# Patient Record
Sex: FEMALE | Race: WHITE | NOT HISPANIC OR LATINO | Employment: STUDENT | URBAN - METROPOLITAN AREA
[De-identification: names, ages, dates, MRNs, and addresses within clinical notes are randomized per-mention and may not be internally consistent; named-entity substitution may affect disease eponyms.]

---

## 2019-07-16 ENCOUNTER — OFFICE VISIT (OUTPATIENT)
Dept: OBSTETRICS AND GYNECOLOGY | Facility: CLINIC | Age: 29
End: 2019-07-16
Payer: COMMERCIAL

## 2019-07-16 ENCOUNTER — TELEPHONE (OUTPATIENT)
Dept: OBSTETRICS AND GYNECOLOGY | Facility: CLINIC | Age: 29
End: 2019-07-16

## 2019-07-16 VITALS
HEART RATE: 70 BPM | HEIGHT: 63 IN | RESPIRATION RATE: 16 BRPM | WEIGHT: 118 LBS | BODY MASS INDEX: 20.91 KG/M2 | DIASTOLIC BLOOD PRESSURE: 60 MMHG | SYSTOLIC BLOOD PRESSURE: 110 MMHG

## 2019-07-16 DIAGNOSIS — Z32.01 PREGNANCY EXAMINATION OR TEST, POSITIVE RESULT: ICD-10-CM

## 2019-07-16 DIAGNOSIS — N91.2 AMENORRHEA: Primary | ICD-10-CM

## 2019-07-16 LAB
B-HCG UR QL: POSITIVE
CTP QC/QA: YES

## 2019-07-16 PROCEDURE — 99999 PR PBB SHADOW E&M-NEW PATIENT-LVL III: ICD-10-PCS | Mod: PBBFAC,,, | Performed by: ADVANCED PRACTICE MIDWIFE

## 2019-07-16 PROCEDURE — 81025 POCT URINE PREGNANCY: ICD-10-PCS | Mod: S$GLB,,, | Performed by: ADVANCED PRACTICE MIDWIFE

## 2019-07-16 PROCEDURE — 81025 URINE PREGNANCY TEST: CPT | Mod: S$GLB,,, | Performed by: ADVANCED PRACTICE MIDWIFE

## 2019-07-16 PROCEDURE — 99999 PR PBB SHADOW E&M-NEW PATIENT-LVL III: CPT | Mod: PBBFAC,,, | Performed by: ADVANCED PRACTICE MIDWIFE

## 2019-07-16 PROCEDURE — 87086 URINE CULTURE/COLONY COUNT: CPT

## 2019-07-16 PROCEDURE — 99204 PR OFFICE/OUTPT VISIT, NEW, LEVL IV, 45-59 MIN: ICD-10-PCS | Mod: 25,S$GLB,, | Performed by: ADVANCED PRACTICE MIDWIFE

## 2019-07-16 PROCEDURE — 99204 OFFICE O/P NEW MOD 45 MIN: CPT | Mod: 25,S$GLB,, | Performed by: ADVANCED PRACTICE MIDWIFE

## 2019-07-16 NOTE — TELEPHONE ENCOUNTER
----- Message from Inna Ríos MA sent at 7/16/2019  9:11 AM CDT -----  Contact: Self      ----- Message -----  From: Nette Castellon  Sent: 7/16/2019   8:49 AM  To: Adan Abraham Staff    Joanna Reyna  MRN: [id number]  No relevant phone numbers on file.    No care team member to display  OB? Yes, Unknown  What phone number can you be reached at? [phone]  Message:   Pt would like a NP dx preg appt with any female physician. Please return call.

## 2019-07-16 NOTE — PROGRESS NOTES
CHIEF COMPLAINT:   Patient presents with      Possible Pregnancy        HISTORY OF PRESENT ILLNESS  Joanna Reyna 29 y.o.  presents for pregnancy risk assessment.   The patient has no complaints today.  No nausea or vomiting. No bleeding or pain.  Pregnancy was  planned and is desired.  Partner is supportive of pregnancy.  Lives at home with fly.  Denies domestic abuse.  Denies chemical/pesticide/radiation exposure. Has had 3 normal vag del, [facility].   OB history:      LMP: Patient's last menstrual period was 2019 (exact date).  EDC: Estimated Date of Delivery: None noted.  EGA: 9+1      Health Maintenance   Topic Date Due    Lipid Panel  1988    TETANUS VACCINE  2006    Pap Smear with HPV Cotest  2009    Influenza Vaccine  2019       History reviewed. No pertinent past medical history.    History reviewed. No pertinent surgical history.    Family History   Problem Relation Age of Onset    Colon cancer Paternal Grandfather     Ovarian cancer Mother     Breast cancer Neg Hx        Social History     Socioeconomic History    Marital status: Single     Spouse name: Not on file    Number of children: Not on file    Years of education: Not on file    Highest education level: Not on file   Occupational History    Not on file   Social Needs    Financial resource strain: Not on file    Food insecurity:     Worry: Not on file     Inability: Not on file    Transportation needs:     Medical: Not on file     Non-medical: Not on file   Tobacco Use    Smoking status: Never Smoker    Smokeless tobacco: Never Used   Substance and Sexual Activity    Alcohol use: Not Currently    Drug use: Never    Sexual activity: Yes     Partners: Male     Birth control/protection: None     Comment: single   Lifestyle    Physical activity:     Days per week: Not on file     Minutes per session: Not on file     Stress: Not on file   Relationships    Social connections:     Talks on phone: Not on file     Gets together: Not on file     Attends Latter-day service: Not on file     Active member of club or organization: Not on file     Attends meetings of clubs or organizations: Not on file     Relationship status: Not on file   Other Topics Concern    Not on file   Social History Narrative    Not on file       Current Outpatient Medications   Medication Sig Dispense Refill    prenatal vit calc,iron,folic (PRENATAL VITAMIN ORAL) Take by mouth.       No current facility-administered medications for this visit.        Review of patient's allergies indicates:  No Known Allergies      PHYSICAL EXAM   Vitals:    07/16/19 1647   BP: 110/60   Pulse: 70   Resp: 16        PAIN SCALE: 0/10 None    PHYSICAL EXAM    ROS:  GENERAL: No fever, chills, fatigability or weight loss.  CV: Denies chest pain  PULM: Denies shortness of breath or wheezing.  ABDOMEN: Appetite fine. No weight loss. Denies diarrhea, abdominal pain, hematemesis or blood in stool.  URINARY: No flank pain, dysuria or hematuria.  REPRODUCTIVE: No abnormal vaginal bleeding.       PE:   APPEARANCE: Well nourished, well developed, in no acute distress  CHEST: Clear to auscultation bilaterally  CV: Regular rate and rhythm  BREASTS: Symmetrical, no skin changes or visible lesions. No palpable masses, nipple discharge or adenopathy bilaterally.  ABDOMEN: Soft. No tenderness or masses. No hepatosplenomegaly. No hernias  PELVIC:   VULVA: No lesions. Normal female genitalia.  URETHRAL MEATUS: Normal size and location, no lesions, no prolapse.  URETHRA: No masses, tenderness, prolapse or scarring.  VAGINA: Moist and well rugated, no discharge, no significant cystocele or rectocele.  CERVIX: No lesions, normal diameter, no stenosis, no cervical motion tenderness.   UTERUS:regular shape, mobile, non-tender, normal position, good support.  ADNEXA: No masses, tenderness or CDS  nodularity.  ANUS PERINEUM: No lesions, no relaxation, no external hemorrhoids.     UPT +    A/P:     -      Patient was counseled today on A.C.S. Pap guidelines and recommendations for yearly pelvic exams, mammograms and monthly self breast exams; to see her PCP for other health maintenance and pregnancy.   -      Patient's medications and medical history reviewed with patient and implications in pregnancy.   -      Pregnancy course discussed and 'AtoZ' book given. Patient was counseled on proper weight gain based on the [place] [employer]'s recommendations based on her pre-pregnancy weight. Discussed foods to avoid in pregnancy (i.e. sushi, fish that are high in mercury, deli meat, and unpasteurized cheeses). Discussed prenatal vitamin options (i.e. stool softener, DHA). Discussed potential medical problems in pregnancy.  -     Discussed risk of Toxoplasmosis transmission from pets and reviewed risk reduction techniques.  -     Pt was counseled on exercise in pregnancy and weight gain recommendations.  -     Pt was counseled on travel recommendations and on risks of Zika virus exposure.  Current CDC Zika advisories and prevention techniques were reviewed with pt.  Pt denies any recent international travel and does not plan travel during pregnancy.  Pt reports that partner does not plan travel either.   -     Oriented to practice including CNM collaboration.   -     Follow-up initial OB 4 weeks, with labs today and u/s this week.

## 2019-07-17 ENCOUNTER — PATIENT MESSAGE (OUTPATIENT)
Dept: OBSTETRICS AND GYNECOLOGY | Facility: CLINIC | Age: 29
End: 2019-07-17

## 2019-07-17 ENCOUNTER — TELEPHONE (OUTPATIENT)
Dept: OBSTETRICS AND GYNECOLOGY | Facility: CLINIC | Age: 29
End: 2019-07-17

## 2019-07-17 NOTE — TELEPHONE ENCOUNTER
Consult and visit notes from [facility] (DOS 05/2019-present) received. Reviewed by Leah Salcedo CNM . Scanned to chart.

## 2019-07-18 ENCOUNTER — LAB VISIT (OUTPATIENT)
Dept: LAB | Facility: HOSPITAL | Age: 29
End: 2019-07-18
Attending: ADVANCED PRACTICE MIDWIFE
Payer: COMMERCIAL

## 2019-07-18 ENCOUNTER — PROCEDURE VISIT (OUTPATIENT)
Dept: OBSTETRICS AND GYNECOLOGY | Facility: CLINIC | Age: 29
End: 2019-07-18
Payer: COMMERCIAL

## 2019-07-18 DIAGNOSIS — N91.2 AMENORRHEA: ICD-10-CM

## 2019-07-18 LAB
ABO + RH BLD: NORMAL
BACTERIA UR CULT: NORMAL
BASOPHILS # BLD AUTO: 0.03 K/UL (ref 0–0.2)
BASOPHILS NFR BLD: 0.3 % (ref 0–1.9)
BLD GP AB SCN CELLS X3 SERPL QL: NORMAL
DIFFERENTIAL METHOD: ABNORMAL
EOSINOPHIL # BLD AUTO: 0.1 K/UL (ref 0–0.5)
EOSINOPHIL NFR BLD: 0.5 % (ref 0–8)
ERYTHROCYTE [DISTWIDTH] IN BLOOD BY AUTOMATED COUNT: 13.1 % (ref 11.5–14.5)
HCT VFR BLD AUTO: 35.6 % (ref 37–48.5)
HGB BLD-MCNC: 12.2 G/DL (ref 12–16)
IMM GRANULOCYTES # BLD AUTO: 0.04 K/UL (ref 0–0.04)
IMM GRANULOCYTES NFR BLD AUTO: 0.4 % (ref 0–0.5)
LYMPHOCYTES # BLD AUTO: 2.9 K/UL (ref 1–4.8)
LYMPHOCYTES NFR BLD: 27.1 % (ref 18–48)
MCH RBC QN AUTO: 31.4 PG (ref 27–31)
MCHC RBC AUTO-ENTMCNC: 34.3 G/DL (ref 32–36)
MCV RBC AUTO: 92 FL (ref 82–98)
MONOCYTES # BLD AUTO: 0.6 K/UL (ref 0.3–1)
MONOCYTES NFR BLD: 5.9 % (ref 4–15)
NEUTROPHILS # BLD AUTO: 7 K/UL (ref 1.8–7.7)
NEUTROPHILS NFR BLD: 65.8 % (ref 38–73)
NRBC BLD-RTO: 0 /100 WBC
PLATELET # BLD AUTO: 223 K/UL (ref 150–350)
PMV BLD AUTO: 11.3 FL (ref 9.2–12.9)
RBC # BLD AUTO: 3.89 M/UL (ref 4–5.4)
WBC # BLD AUTO: 10.64 K/UL (ref 3.9–12.7)

## 2019-07-18 PROCEDURE — 86901 BLOOD TYPING SEROLOGIC RH(D): CPT

## 2019-07-18 PROCEDURE — 36415 COLL VENOUS BLD VENIPUNCTURE: CPT

## 2019-07-18 PROCEDURE — 99499 NO LOS: ICD-10-PCS | Mod: S$GLB,,, | Performed by: OBSTETRICS & GYNECOLOGY

## 2019-07-18 PROCEDURE — 76801 OB US < 14 WKS SINGLE FETUS: CPT | Mod: S$GLB,,, | Performed by: OBSTETRICS & GYNECOLOGY

## 2019-07-18 PROCEDURE — 86703 HIV-1/HIV-2 1 RESULT ANTBDY: CPT

## 2019-07-18 PROCEDURE — 86762 RUBELLA ANTIBODY: CPT

## 2019-07-18 PROCEDURE — 87340 HEPATITIS B SURFACE AG IA: CPT

## 2019-07-18 PROCEDURE — 85025 COMPLETE CBC W/AUTO DIFF WBC: CPT

## 2019-07-18 PROCEDURE — 99499 UNLISTED E&M SERVICE: CPT | Mod: S$GLB,,, | Performed by: OBSTETRICS & GYNECOLOGY

## 2019-07-18 PROCEDURE — 81220 CFTR GENE COM VARIANTS: CPT

## 2019-07-18 PROCEDURE — 76801 PR US, OB <14WKS, TRANSABD, SINGLE GESTATION: ICD-10-PCS | Mod: S$GLB,,, | Performed by: OBSTETRICS & GYNECOLOGY

## 2019-07-18 PROCEDURE — 86592 SYPHILIS TEST NON-TREP QUAL: CPT

## 2019-07-19 LAB
HBV SURFACE AG SERPL QL IA: NEGATIVE
HIV 1+2 AB+HIV1 P24 AG SERPL QL IA: NEGATIVE
RPR SER QL: NORMAL
RUBV IGG SER-ACNC: 66.2 IU/ML
RUBV IGG SER-IMP: REACTIVE

## 2019-07-25 ENCOUNTER — TELEPHONE (OUTPATIENT)
Dept: OBSTETRICS AND GYNECOLOGY | Facility: CLINIC | Age: 29
End: 2019-07-25

## 2019-07-25 DIAGNOSIS — Z14.1 CYSTIC FIBROSIS CARRIER IN FIRST TRIMESTER, ANTEPARTUM: ICD-10-CM

## 2019-07-25 DIAGNOSIS — O09.891 CYSTIC FIBROSIS CARRIER IN FIRST TRIMESTER, ANTEPARTUM: ICD-10-CM

## 2019-07-25 LAB — CFTR MUT ANL BLD/T: ABNORMAL

## 2019-07-25 NOTE — TELEPHONE ENCOUNTER
Lauren from [facility] main lab calling with results of CF screening. Reports CF mutation heterozygus Delta S 508. Leah Salcedo CNM notified at 12:50pm. Per Leah message received.

## 2019-08-13 ENCOUNTER — INITIAL PRENATAL (OUTPATIENT)
Dept: OBSTETRICS AND GYNECOLOGY | Facility: CLINIC | Age: 29
End: 2019-08-13
Payer: COMMERCIAL

## 2019-08-13 VITALS
WEIGHT: 122 LBS | BODY MASS INDEX: 21.61 KG/M2 | SYSTOLIC BLOOD PRESSURE: 106 MMHG | HEART RATE: 84 BPM | DIASTOLIC BLOOD PRESSURE: 67 MMHG

## 2019-08-13 DIAGNOSIS — O09.891 CYSTIC FIBROSIS CARRIER IN FIRST TRIMESTER, ANTEPARTUM: Primary | ICD-10-CM

## 2019-08-13 DIAGNOSIS — Z14.1 CYSTIC FIBROSIS CARRIER IN FIRST TRIMESTER, ANTEPARTUM: Primary | ICD-10-CM

## 2019-08-13 DIAGNOSIS — Z86.19 HISTORY OF CHLAMYDIA: ICD-10-CM

## 2019-08-13 DIAGNOSIS — Z3A.14 14 WEEKS GESTATION OF PREGNANCY: ICD-10-CM

## 2019-08-13 LAB
C TRACH DNA SPEC QL NAA+PROBE: NOT DETECTED
N GONORRHOEA DNA SPEC QL NAA+PROBE: NOT DETECTED

## 2019-08-13 PROCEDURE — 99999 PR PBB SHADOW E&M-EST. PATIENT-LVL II: ICD-10-PCS | Mod: PBBFAC,,, | Performed by: ADVANCED PRACTICE MIDWIFE

## 2019-08-13 PROCEDURE — 0500F INITIAL PRENATAL CARE VISIT: CPT | Mod: S$GLB,,, | Performed by: ADVANCED PRACTICE MIDWIFE

## 2019-08-13 PROCEDURE — 87491 CHLMYD TRACH DNA AMP PROBE: CPT

## 2019-08-13 PROCEDURE — 99999 PR PBB SHADOW E&M-EST. PATIENT-LVL II: CPT | Mod: PBBFAC,,, | Performed by: ADVANCED PRACTICE MIDWIFE

## 2019-08-13 PROCEDURE — 0500F PR INITIAL PRENATAL CARE VISIT: ICD-10-PCS | Mod: S$GLB,,, | Performed by: ADVANCED PRACTICE MIDWIFE

## 2019-08-13 NOTE — PROGRESS NOTES
Patient doing well. No vaginal bleeding or cramping noted. Discussed the need for an anatomy scan between 18-20 weeks. Discussed quad screen. RTC in 4 weeks.     Coffective counseling sheet Get Ready discussed with mother. Reinforced avoiding induction of labor unless medically indicated as well as comfort measures during labor.  Encouraged mother to download Coffective mobile aaron if she has not already done so. Mother verbalizes understanding.    Gen Probe damaris today      has not done CF screen , will order

## 2019-09-10 ENCOUNTER — ROUTINE PRENATAL (OUTPATIENT)
Dept: OBSTETRICS AND GYNECOLOGY | Facility: CLINIC | Age: 29
End: 2019-09-10
Payer: COMMERCIAL

## 2019-09-10 ENCOUNTER — PATIENT MESSAGE (OUTPATIENT)
Dept: OBSTETRICS AND GYNECOLOGY | Facility: CLINIC | Age: 29
End: 2019-09-10

## 2019-09-10 ENCOUNTER — PROCEDURE VISIT (OUTPATIENT)
Dept: OBSTETRICS AND GYNECOLOGY | Facility: CLINIC | Age: 29
End: 2019-09-10
Payer: COMMERCIAL

## 2019-09-10 VITALS
SYSTOLIC BLOOD PRESSURE: 110 MMHG | HEART RATE: 87 BPM | BODY MASS INDEX: 22.67 KG/M2 | WEIGHT: 128 LBS | DIASTOLIC BLOOD PRESSURE: 62 MMHG

## 2019-09-10 DIAGNOSIS — Z36.89 ENCOUNTER FOR FETAL ANATOMIC SURVEY: ICD-10-CM

## 2019-09-10 DIAGNOSIS — Z3A.14 14 WEEKS GESTATION OF PREGNANCY: ICD-10-CM

## 2019-09-10 DIAGNOSIS — Z3A.18 18 WEEKS GESTATION OF PREGNANCY: Primary | ICD-10-CM

## 2019-09-10 PROCEDURE — 99999 PR PBB SHADOW E&M-EST. PATIENT-LVL II: CPT | Mod: PBBFAC,,, | Performed by: ADVANCED PRACTICE MIDWIFE

## 2019-09-10 PROCEDURE — 99999 PR PBB SHADOW E&M-EST. PATIENT-LVL II: ICD-10-PCS | Mod: PBBFAC,,, | Performed by: ADVANCED PRACTICE MIDWIFE

## 2019-09-10 PROCEDURE — 76805 PR US, OB 14+WKS, TRANSABD, SINGLE GESTATION: ICD-10-PCS | Mod: S$GLB,,, | Performed by: OBSTETRICS & GYNECOLOGY

## 2019-09-10 PROCEDURE — 0502F PR SUBSEQUENT PRENATAL CARE: ICD-10-PCS | Mod: S$GLB,,, | Performed by: ADVANCED PRACTICE MIDWIFE

## 2019-09-10 PROCEDURE — 76805 OB US >/= 14 WKS SNGL FETUS: CPT | Mod: S$GLB,,, | Performed by: OBSTETRICS & GYNECOLOGY

## 2019-09-10 PROCEDURE — 0502F SUBSEQUENT PRENATAL CARE: CPT | Mod: S$GLB,,, | Performed by: ADVANCED PRACTICE MIDWIFE

## 2019-09-10 NOTE — LETTER
September 11, 2019      [facility]  [address] [place] [id number]  Phone: [phone]       Patient: Joanna Reyna   YOB: 1990  Date of Visit: 09/10/2019    To Whom It May Concern:    Donnie Reyna  was at [facility] on 09/10/2019. She may return to work/school on 9/11/2019 with no restrictions. If you have any questions or concerns, or if I can be of further assistance, please do not hesitate to contact me.    Sincerely,    Queta Goyal LPN

## 2019-09-10 NOTE — PROGRESS NOTES
Patient with no complaints. Denies vaginal bleeding or cramping.  Patient request  quad screen. Anatomy scan today. RTC in 4 weeks    Coffective counseling sheet Fall In Love discussed with mother. Reinforced immediate skin to skin, the magic first hour, importance of the first feeding and delaying routine procedures. Encouraged mother to download Coffective mobile aaron if she has not already done so. Mother verbalizes understanding.

## 2019-09-11 ENCOUNTER — PATIENT MESSAGE (OUTPATIENT)
Dept: OBSTETRICS AND GYNECOLOGY | Facility: CLINIC | Age: 29
End: 2019-09-11

## 2019-09-11 NOTE — TELEPHONE ENCOUNTER
Excuse for appointment 9/10/2019 created, signed and faxed to number provided. Fax confirmation received. Pt notified via pt portal.

## 2019-10-10 ENCOUNTER — ROUTINE PRENATAL (OUTPATIENT)
Dept: OBSTETRICS AND GYNECOLOGY | Facility: CLINIC | Age: 29
End: 2019-10-10
Payer: COMMERCIAL

## 2019-10-10 VITALS
WEIGHT: 137 LBS | HEART RATE: 86 BPM | BODY MASS INDEX: 24.27 KG/M2 | SYSTOLIC BLOOD PRESSURE: 98 MMHG | DIASTOLIC BLOOD PRESSURE: 60 MMHG

## 2019-10-10 DIAGNOSIS — Z3A.22 22 WEEKS GESTATION OF PREGNANCY: Primary | ICD-10-CM

## 2019-10-10 PROCEDURE — 0502F SUBSEQUENT PRENATAL CARE: CPT | Mod: S$GLB,,, | Performed by: ADVANCED PRACTICE MIDWIFE

## 2019-10-10 PROCEDURE — 99999 PR PBB SHADOW E&M-EST. PATIENT-LVL II: CPT | Mod: PBBFAC,,, | Performed by: ADVANCED PRACTICE MIDWIFE

## 2019-10-10 PROCEDURE — 0502F PR SUBSEQUENT PRENATAL CARE: ICD-10-PCS | Mod: S$GLB,,, | Performed by: ADVANCED PRACTICE MIDWIFE

## 2019-10-10 PROCEDURE — 99999 PR PBB SHADOW E&M-EST. PATIENT-LVL II: ICD-10-PCS | Mod: PBBFAC,,, | Performed by: ADVANCED PRACTICE MIDWIFE

## 2019-10-10 NOTE — PROGRESS NOTES
Patient with no complaints. Denies vaginal bleeding or cramping.  Good FM. Discussed with patient having glucose testing for gestational diabetes preformed between 24-28 weeks. RTC in 4 weeks.   Pt unsure the results of the FOB CF screen , she will get results to us   Coffective counseling sheet Learn Your Baby and Protect Breastfeeding discussed with mother. Instructed regarding feeding cues and methods to calm baby. Encouraged mother to download Coffective mobile aaron if she has not already done so.  Mother verbalized understanding.

## 2019-10-31 ENCOUNTER — PATIENT MESSAGE (OUTPATIENT)
Dept: OBSTETRICS AND GYNECOLOGY | Facility: CLINIC | Age: 29
End: 2019-10-31

## 2019-11-10 ENCOUNTER — PATIENT MESSAGE (OUTPATIENT)
Dept: OBSTETRICS AND GYNECOLOGY | Facility: CLINIC | Age: 29
End: 2019-11-10

## 2019-11-15 ENCOUNTER — ROUTINE PRENATAL (OUTPATIENT)
Dept: OBSTETRICS AND GYNECOLOGY | Facility: CLINIC | Age: 29
End: 2019-11-15
Payer: COMMERCIAL

## 2019-11-15 ENCOUNTER — LAB VISIT (OUTPATIENT)
Dept: LAB | Facility: HOSPITAL | Age: 29
End: 2019-11-15
Attending: ADVANCED PRACTICE MIDWIFE
Payer: COMMERCIAL

## 2019-11-15 VITALS
SYSTOLIC BLOOD PRESSURE: 98 MMHG | BODY MASS INDEX: 25.26 KG/M2 | HEART RATE: 87 BPM | DIASTOLIC BLOOD PRESSURE: 60 MMHG | WEIGHT: 142.63 LBS

## 2019-11-15 DIAGNOSIS — Z3A.22 22 WEEKS GESTATION OF PREGNANCY: ICD-10-CM

## 2019-11-15 DIAGNOSIS — R73.09 ABNORMAL GLUCOSE TOLERANCE TEST: Primary | ICD-10-CM

## 2019-11-15 LAB
BASOPHILS # BLD AUTO: 0.02 K/UL (ref 0–0.2)
BASOPHILS NFR BLD: 0.2 % (ref 0–1.9)
DIFFERENTIAL METHOD: ABNORMAL
EOSINOPHIL # BLD AUTO: 0.1 K/UL (ref 0–0.5)
EOSINOPHIL NFR BLD: 0.5 % (ref 0–8)
ERYTHROCYTE [DISTWIDTH] IN BLOOD BY AUTOMATED COUNT: 12.5 % (ref 11.5–14.5)
GLUCOSE SERPL-MCNC: 162 MG/DL (ref 70–140)
HCT VFR BLD AUTO: 29.4 % (ref 37–48.5)
HGB BLD-MCNC: 9.5 G/DL (ref 12–16)
IMM GRANULOCYTES # BLD AUTO: 0.18 K/UL (ref 0–0.04)
IMM GRANULOCYTES NFR BLD AUTO: 1.6 % (ref 0–0.5)
LYMPHOCYTES # BLD AUTO: 2.6 K/UL (ref 1–4.8)
LYMPHOCYTES NFR BLD: 22.8 % (ref 18–48)
MCH RBC QN AUTO: 32.1 PG (ref 27–31)
MCHC RBC AUTO-ENTMCNC: 32.3 G/DL (ref 32–36)
MCV RBC AUTO: 99 FL (ref 82–98)
MONOCYTES # BLD AUTO: 0.6 K/UL (ref 0.3–1)
MONOCYTES NFR BLD: 5.5 % (ref 4–15)
NEUTROPHILS # BLD AUTO: 8 K/UL (ref 1.8–7.7)
NEUTROPHILS NFR BLD: 69.4 % (ref 38–73)
NRBC BLD-RTO: 0 /100 WBC
PLATELET # BLD AUTO: 225 K/UL (ref 150–350)
PMV BLD AUTO: 11.2 FL (ref 9.2–12.9)
RBC # BLD AUTO: 2.96 M/UL (ref 4–5.4)
RPR SER QL: NORMAL
WBC # BLD AUTO: 11.55 K/UL (ref 3.9–12.7)

## 2019-11-15 PROCEDURE — 0502F SUBSEQUENT PRENATAL CARE: CPT | Mod: S$GLB,,, | Performed by: ADVANCED PRACTICE MIDWIFE

## 2019-11-15 PROCEDURE — 99999 PR PBB SHADOW E&M-EST. PATIENT-LVL II: CPT | Mod: PBBFAC,,, | Performed by: ADVANCED PRACTICE MIDWIFE

## 2019-11-15 PROCEDURE — 36415 COLL VENOUS BLD VENIPUNCTURE: CPT

## 2019-11-15 PROCEDURE — 86703 HIV-1/HIV-2 1 RESULT ANTBDY: CPT

## 2019-11-15 PROCEDURE — 82950 GLUCOSE TEST: CPT

## 2019-11-15 PROCEDURE — 0502F PR SUBSEQUENT PRENATAL CARE: ICD-10-PCS | Mod: S$GLB,,, | Performed by: ADVANCED PRACTICE MIDWIFE

## 2019-11-15 PROCEDURE — 85025 COMPLETE CBC W/AUTO DIFF WBC: CPT

## 2019-11-15 PROCEDURE — 86592 SYPHILIS TEST NON-TREP QUAL: CPT

## 2019-11-15 PROCEDURE — 99999 PR PBB SHADOW E&M-EST. PATIENT-LVL II: ICD-10-PCS | Mod: PBBFAC,,, | Performed by: ADVANCED PRACTICE MIDWIFE

## 2019-11-15 NOTE — PROGRESS NOTES
Patient with no complaints. Denies vaginal bleeding or contractions. Good FM. GTT/CBC ordered today.   labor precautions discussed with patient. RTC in 2 weeks.   Abnormal glucose test noted  Discussed 3 HR AND ordered   Pt discussed living arrangements changed   Pt possibly wants BTL, will decide by next ov.   Coffective counseling sheet Nourish discussed with mother. Reinforced basic breastfeeding position and latch as well as proper hand expression technique and avoidance of artificial nipples and formula unless medically indicated. Encouraged mother to download Coffective mobile aaron if she has not already done so.  Mother verbalizes understanding.

## 2019-11-18 LAB — HIV 1+2 AB+HIV1 P24 AG SERPL QL IA: NEGATIVE

## 2019-11-21 ENCOUNTER — LAB VISIT (OUTPATIENT)
Dept: LAB | Facility: HOSPITAL | Age: 29
End: 2019-11-21
Attending: ADVANCED PRACTICE MIDWIFE
Payer: MEDICAID

## 2019-11-21 DIAGNOSIS — R73.09 ABNORMAL GLUCOSE TOLERANCE TEST: ICD-10-CM

## 2019-11-21 LAB
GLUCOSE SERPL-MCNC: 125 MG/DL
GLUCOSE SERPL-MCNC: 139 MG/DL
GLUCOSE SERPL-MCNC: 174 MG/DL
GLUCOSE SERPL-MCNC: 80 MG/DL (ref 70–110)

## 2019-11-21 PROCEDURE — 36415 COLL VENOUS BLD VENIPUNCTURE: CPT

## 2019-11-21 PROCEDURE — 82952 GTT-ADDED SAMPLES: CPT

## 2019-11-21 PROCEDURE — 82951 GLUCOSE TOLERANCE TEST (GTT): CPT

## 2019-12-04 ENCOUNTER — ROUTINE PRENATAL (OUTPATIENT)
Dept: OBSTETRICS AND GYNECOLOGY | Facility: CLINIC | Age: 29
End: 2019-12-04
Payer: COMMERCIAL

## 2019-12-04 VITALS
WEIGHT: 148 LBS | SYSTOLIC BLOOD PRESSURE: 102 MMHG | BODY MASS INDEX: 26.22 KG/M2 | DIASTOLIC BLOOD PRESSURE: 62 MMHG | HEART RATE: 82 BPM

## 2019-12-04 DIAGNOSIS — Z3A.30 30 WEEKS GESTATION OF PREGNANCY: Primary | ICD-10-CM

## 2019-12-04 PROCEDURE — 99999 PR PBB SHADOW E&M-EST. PATIENT-LVL II: CPT | Mod: PBBFAC,,, | Performed by: ADVANCED PRACTICE MIDWIFE

## 2019-12-04 PROCEDURE — 99999 PR PBB SHADOW E&M-EST. PATIENT-LVL II: ICD-10-PCS | Mod: PBBFAC,,, | Performed by: ADVANCED PRACTICE MIDWIFE

## 2019-12-04 PROCEDURE — 0502F SUBSEQUENT PRENATAL CARE: CPT | Mod: S$GLB,,, | Performed by: ADVANCED PRACTICE MIDWIFE

## 2019-12-04 PROCEDURE — 0502F PR SUBSEQUENT PRENATAL CARE: ICD-10-PCS | Mod: S$GLB,,, | Performed by: ADVANCED PRACTICE MIDWIFE

## 2019-12-04 NOTE — PROGRESS NOTES
Patient with no complaints. Denies vaginal bleeding or contractions. Good FM. Growth scan ordered for next visit 2 weeks    Declined TDAP     Coffective Motivation Document discussed with mother. Reinforced benefits of breastfeeding, risk of formula, and cue based feedings. Encouraged mother to download Coffective mobile aaron if she has not already done so. Mother verbalizes understanding.

## 2019-12-19 ENCOUNTER — PROCEDURE VISIT (OUTPATIENT)
Dept: OBSTETRICS AND GYNECOLOGY | Facility: CLINIC | Age: 29
End: 2019-12-19
Payer: COMMERCIAL

## 2019-12-19 ENCOUNTER — ROUTINE PRENATAL (OUTPATIENT)
Dept: OBSTETRICS AND GYNECOLOGY | Facility: CLINIC | Age: 29
End: 2019-12-19
Payer: COMMERCIAL

## 2019-12-19 VITALS
SYSTOLIC BLOOD PRESSURE: 98 MMHG | HEART RATE: 88 BPM | DIASTOLIC BLOOD PRESSURE: 68 MMHG | WEIGHT: 149.63 LBS | BODY MASS INDEX: 26.5 KG/M2

## 2019-12-19 DIAGNOSIS — Z36.89 ENCOUNTER FOR ULTRASOUND TO ASSESS FETAL GROWTH: ICD-10-CM

## 2019-12-19 DIAGNOSIS — Z3A.32 32 WEEKS GESTATION OF PREGNANCY: Primary | ICD-10-CM

## 2019-12-19 DIAGNOSIS — Z3A.30 30 WEEKS GESTATION OF PREGNANCY: ICD-10-CM

## 2019-12-19 PROCEDURE — 99999 PR PBB SHADOW E&M-EST. PATIENT-LVL II: ICD-10-PCS | Mod: PBBFAC,,, | Performed by: ADVANCED PRACTICE MIDWIFE

## 2019-12-19 PROCEDURE — 76816 OB US FOLLOW-UP PER FETUS: CPT | Mod: S$GLB,,, | Performed by: OBSTETRICS & GYNECOLOGY

## 2019-12-19 PROCEDURE — 76816 PR  US,PREGNANT UTERUS,F/U,TRANSABD APP: ICD-10-PCS | Mod: S$GLB,,, | Performed by: OBSTETRICS & GYNECOLOGY

## 2019-12-19 PROCEDURE — 99999 PR PBB SHADOW E&M-EST. PATIENT-LVL II: CPT | Mod: PBBFAC,,, | Performed by: ADVANCED PRACTICE MIDWIFE

## 2019-12-19 PROCEDURE — 0502F SUBSEQUENT PRENATAL CARE: CPT | Mod: S$GLB,,, | Performed by: ADVANCED PRACTICE MIDWIFE

## 2019-12-19 PROCEDURE — 0502F PR SUBSEQUENT PRENATAL CARE: ICD-10-PCS | Mod: S$GLB,,, | Performed by: ADVANCED PRACTICE MIDWIFE

## 2019-12-19 NOTE — PROGRESS NOTES
Patient with no complaints. Denies vaginal bleeding or contractions. Good FM. Growth scan reviewed.    Reports occ palpitations with occ denies cp. Declined referral, and will notify us if persistent     Coffective counseling sheet Build Your Team discussed with mother. Reinforced importance of early identification of support team including champion, OB provider, pediatrician and local community resources. Encouraged mother to download Coffective mobile aaron if she has not already done so.  Mother verbalizes understanding. Also discussed quiet time and delayed bathing.

## 2019-12-19 NOTE — PROGRESS NOTES
Normal fluid and growth Obstetrical ultrasound completed today.  See report in imaging section of [facility].

## 2019-12-27 ENCOUNTER — TELEPHONE (OUTPATIENT)
Dept: OBSTETRICS AND GYNECOLOGY | Facility: CLINIC | Age: 29
End: 2019-12-27

## 2019-12-27 NOTE — TELEPHONE ENCOUNTER
33w4d OB states that she is having palpitations. She reports that she can feel her heart beating and racing. Instructed that palpitations can occur during pregnancy. That it may feel like heart is racing or skipping a beat. While palpitations can be normal, if they are associated with chest pain, shortness of breath, or fatigue she should be evaluated. Patient states that she does feel some tingling in her face and shortness of breath. Patient instructed to report to ER to be evaluated. Instructed that she should have someone drive her, and should not attempt to drive herself. Patient verbalized understanding and states that she will try to call and find someone to bring her. Instructed if she can not find anyone that she should call 911. Patient verbalized understanding.

## 2020-01-06 ENCOUNTER — ROUTINE PRENATAL (OUTPATIENT)
Dept: OBSTETRICS AND GYNECOLOGY | Facility: CLINIC | Age: 30
End: 2020-01-06
Payer: MEDICAID

## 2020-01-06 VITALS
WEIGHT: 153 LBS | BODY MASS INDEX: 27.1 KG/M2 | SYSTOLIC BLOOD PRESSURE: 116 MMHG | HEART RATE: 87 BPM | DIASTOLIC BLOOD PRESSURE: 66 MMHG

## 2020-01-06 DIAGNOSIS — Z3A.35 35 WEEKS GESTATION OF PREGNANCY: Primary | ICD-10-CM

## 2020-01-06 PROCEDURE — 99999 PR PBB SHADOW E&M-EST. PATIENT-LVL II: CPT | Mod: PBBFAC,,, | Performed by: ADVANCED PRACTICE MIDWIFE

## 2020-01-06 PROCEDURE — 99212 OFFICE O/P EST SF 10 MIN: CPT | Mod: PBBFAC | Performed by: ADVANCED PRACTICE MIDWIFE

## 2020-01-06 PROCEDURE — 99999 PR PBB SHADOW E&M-EST. PATIENT-LVL II: ICD-10-PCS | Mod: PBBFAC,,, | Performed by: ADVANCED PRACTICE MIDWIFE

## 2020-01-06 PROCEDURE — 0502F PR SUBSEQUENT PRENATAL CARE: ICD-10-PCS | Mod: ,,, | Performed by: ADVANCED PRACTICE MIDWIFE

## 2020-01-06 PROCEDURE — 0502F SUBSEQUENT PRENATAL CARE: CPT | Mod: ,,, | Performed by: ADVANCED PRACTICE MIDWIFE

## 2020-01-06 NOTE — PROGRESS NOTES
Patient with no complaints. Denies vaginal bleeding or contractions. Good FM. GBS collected today. Labor precautions discused with patient. RTC in 1 week.     Denies heart palpitations

## 2020-01-16 ENCOUNTER — ROUTINE PRENATAL (OUTPATIENT)
Dept: OBSTETRICS AND GYNECOLOGY | Facility: CLINIC | Age: 30
End: 2020-01-16
Payer: COMMERCIAL

## 2020-01-16 VITALS
WEIGHT: 154.19 LBS | SYSTOLIC BLOOD PRESSURE: 114 MMHG | DIASTOLIC BLOOD PRESSURE: 72 MMHG | HEART RATE: 84 BPM | BODY MASS INDEX: 27.32 KG/M2

## 2020-01-16 DIAGNOSIS — Z3A.36 36 WEEKS GESTATION OF PREGNANCY: Primary | ICD-10-CM

## 2020-01-16 PROCEDURE — 87081 CULTURE SCREEN ONLY: CPT

## 2020-01-16 PROCEDURE — 99999 PR PBB SHADOW E&M-EST. PATIENT-LVL II: CPT | Mod: PBBFAC,,, | Performed by: ADVANCED PRACTICE MIDWIFE

## 2020-01-16 PROCEDURE — 0502F PR SUBSEQUENT PRENATAL CARE: ICD-10-PCS | Mod: S$GLB,,, | Performed by: ADVANCED PRACTICE MIDWIFE

## 2020-01-16 PROCEDURE — 99999 PR PBB SHADOW E&M-EST. PATIENT-LVL II: ICD-10-PCS | Mod: PBBFAC,,, | Performed by: ADVANCED PRACTICE MIDWIFE

## 2020-01-16 PROCEDURE — 0502F SUBSEQUENT PRENATAL CARE: CPT | Mod: S$GLB,,, | Performed by: ADVANCED PRACTICE MIDWIFE

## 2020-01-16 NOTE — PROGRESS NOTES
Patient with no complaints. Denies vaginal bleeding or contractions. Good FM. GBS collected today. Labor precautions discused with patient. RTC in 1 week.

## 2020-01-17 ENCOUNTER — TELEPHONE (OUTPATIENT)
Dept: OBSTETRICS AND GYNECOLOGY | Facility: CLINIC | Age: 30
End: 2020-01-17

## 2020-01-18 LAB — BACTERIA SPEC AEROBE CULT: NORMAL

## 2020-01-22 ENCOUNTER — ROUTINE PRENATAL (OUTPATIENT)
Dept: OBSTETRICS AND GYNECOLOGY | Facility: CLINIC | Age: 30
End: 2020-01-22
Payer: COMMERCIAL

## 2020-01-22 VITALS
HEART RATE: 82 BPM | WEIGHT: 157 LBS | SYSTOLIC BLOOD PRESSURE: 120 MMHG | BODY MASS INDEX: 27.81 KG/M2 | DIASTOLIC BLOOD PRESSURE: 72 MMHG

## 2020-01-22 DIAGNOSIS — Z3A.37 37 WEEKS GESTATION OF PREGNANCY: Primary | ICD-10-CM

## 2020-01-22 PROCEDURE — 0502F PR SUBSEQUENT PRENATAL CARE: ICD-10-PCS | Mod: S$GLB,,, | Performed by: ADVANCED PRACTICE MIDWIFE

## 2020-01-22 PROCEDURE — 99999 PR PBB SHADOW E&M-EST. PATIENT-LVL II: ICD-10-PCS | Mod: PBBFAC,,, | Performed by: ADVANCED PRACTICE MIDWIFE

## 2020-01-22 PROCEDURE — 99999 PR PBB SHADOW E&M-EST. PATIENT-LVL II: CPT | Mod: PBBFAC,,, | Performed by: ADVANCED PRACTICE MIDWIFE

## 2020-01-22 PROCEDURE — 0502F SUBSEQUENT PRENATAL CARE: CPT | Mod: S$GLB,,, | Performed by: ADVANCED PRACTICE MIDWIFE

## 2020-01-22 RX ORDER — CYCLOBENZAPRINE HCL 5 MG
5 TABLET ORAL 3 TIMES DAILY PRN
Qty: 30 TABLET | Refills: 0 | Status: SHIPPED | OUTPATIENT
Start: 2020-01-22 | End: 2020-02-01

## 2020-01-22 NOTE — PROGRESS NOTES
Patient with no complaints. Denies vaginal bleeding or contractions. Good FM. Labor precautions discused with patient. RTC in 1 week.   Some c/o hip pain, flexeril rx sent

## 2020-01-29 ENCOUNTER — ROUTINE PRENATAL (OUTPATIENT)
Dept: OBSTETRICS AND GYNECOLOGY | Facility: CLINIC | Age: 30
End: 2020-01-29
Payer: COMMERCIAL

## 2020-01-29 VITALS
HEART RATE: 89 BPM | SYSTOLIC BLOOD PRESSURE: 122 MMHG | DIASTOLIC BLOOD PRESSURE: 78 MMHG | BODY MASS INDEX: 28.17 KG/M2 | WEIGHT: 159 LBS

## 2020-01-29 DIAGNOSIS — Z3A.38 38 WEEKS GESTATION OF PREGNANCY: Primary | ICD-10-CM

## 2020-01-29 PROCEDURE — 0502F PR SUBSEQUENT PRENATAL CARE: ICD-10-PCS | Mod: S$GLB,,, | Performed by: ADVANCED PRACTICE MIDWIFE

## 2020-01-29 PROCEDURE — 0502F SUBSEQUENT PRENATAL CARE: CPT | Mod: S$GLB,,, | Performed by: ADVANCED PRACTICE MIDWIFE

## 2020-01-29 PROCEDURE — 99999 PR PBB SHADOW E&M-EST. PATIENT-LVL II: CPT | Mod: PBBFAC,,, | Performed by: ADVANCED PRACTICE MIDWIFE

## 2020-01-29 PROCEDURE — 99999 PR PBB SHADOW E&M-EST. PATIENT-LVL II: ICD-10-PCS | Mod: PBBFAC,,, | Performed by: ADVANCED PRACTICE MIDWIFE

## 2020-01-29 NOTE — PROGRESS NOTES
Patient with no complaints. Denies vaginal bleeding or contractions. Good FM.. Labor precautions discused with patient. RTC in 1 week.

## 2020-01-31 ENCOUNTER — PATIENT MESSAGE (OUTPATIENT)
Dept: OBSTETRICS AND GYNECOLOGY | Facility: CLINIC | Age: 30
End: 2020-01-31

## 2020-02-06 ENCOUNTER — ROUTINE PRENATAL (OUTPATIENT)
Dept: OBSTETRICS AND GYNECOLOGY | Facility: CLINIC | Age: 30
End: 2020-02-06
Payer: COMMERCIAL

## 2020-02-06 VITALS
HEART RATE: 90 BPM | BODY MASS INDEX: 28.17 KG/M2 | DIASTOLIC BLOOD PRESSURE: 70 MMHG | SYSTOLIC BLOOD PRESSURE: 112 MMHG | WEIGHT: 159 LBS

## 2020-02-06 DIAGNOSIS — Z3A.39 39 WEEKS GESTATION OF PREGNANCY: Primary | ICD-10-CM

## 2020-02-06 PROCEDURE — 0502F SUBSEQUENT PRENATAL CARE: CPT | Mod: S$GLB,,, | Performed by: ADVANCED PRACTICE MIDWIFE

## 2020-02-06 PROCEDURE — 0502F PR SUBSEQUENT PRENATAL CARE: ICD-10-PCS | Mod: S$GLB,,, | Performed by: ADVANCED PRACTICE MIDWIFE

## 2020-02-06 PROCEDURE — 99999 PR PBB SHADOW E&M-EST. PATIENT-LVL II: ICD-10-PCS | Mod: PBBFAC,,, | Performed by: ADVANCED PRACTICE MIDWIFE

## 2020-02-06 PROCEDURE — 99999 PR PBB SHADOW E&M-EST. PATIENT-LVL II: CPT | Mod: PBBFAC,,, | Performed by: ADVANCED PRACTICE MIDWIFE

## 2020-02-06 NOTE — PROGRESS NOTES
Patient with no complaints. Denies vaginal bleeding or contractions. Good FM.  Labor precautions discused with patient. RTC in 1 week.

## 2020-02-10 ENCOUNTER — HOSPITAL ENCOUNTER (INPATIENT)
Facility: HOSPITAL | Age: 30
LOS: 2 days | Discharge: HOME | End: 2020-02-12
Attending: OBSTETRICS & GYNECOLOGY | Admitting: OBSTETRICS & GYNECOLOGY
Payer: MEDICAID

## 2020-02-10 ENCOUNTER — ANESTHESIA (OUTPATIENT)
Dept: OBSTETRICS AND GYNECOLOGY | Facility: HOSPITAL | Age: 30
End: 2020-02-10

## 2020-02-10 ENCOUNTER — ANESTHESIA EVENT (OUTPATIENT)
Dept: OBSTETRICS AND GYNECOLOGY | Facility: HOSPITAL | Age: 30
End: 2020-02-10

## 2020-02-10 ENCOUNTER — PATIENT MESSAGE (OUTPATIENT)
Dept: OBSTETRICS AND GYNECOLOGY | Facility: CLINIC | Age: 30
End: 2020-02-10

## 2020-02-10 DIAGNOSIS — O47.9 THREATENED LABOR AT TERM: ICD-10-CM

## 2020-02-10 DIAGNOSIS — O48.0 POST TERM PREGNANCY OVER 40 WEEKS: ICD-10-CM

## 2020-02-10 LAB
ABO + RH BLD: NORMAL
AMPHET+METHAMPHET UR QL: NEGATIVE
BARBITURATES UR QL SCN>200 NG/ML: NEGATIVE
BASOPHILS # BLD AUTO: 0.01 K/UL (ref 0–0.2)
BASOPHILS NFR BLD: 0.1 % (ref 0–1.9)
BENZODIAZ UR QL SCN>200 NG/ML: NEGATIVE
BLD GP AB SCN CELLS X3 SERPL QL: NORMAL
BZE UR QL SCN: NEGATIVE
CANNABINOIDS UR QL SCN: NEGATIVE
CREAT UR-MCNC: 19.8 MG/DL (ref 15–325)
DIFFERENTIAL METHOD: ABNORMAL
EOSINOPHIL # BLD AUTO: 0.1 K/UL (ref 0–0.5)
EOSINOPHIL NFR BLD: 0.4 % (ref 0–8)
ERYTHROCYTE [DISTWIDTH] IN BLOOD BY AUTOMATED COUNT: 13.4 % (ref 11.5–14.5)
HCT VFR BLD AUTO: 25.5 % (ref 37–48.5)
HGB BLD-MCNC: 8.2 G/DL (ref 12–16)
IMM GRANULOCYTES # BLD AUTO: 0.24 K/UL (ref 0–0.04)
IMM GRANULOCYTES NFR BLD AUTO: 2.1 % (ref 0–0.5)
LYMPHOCYTES # BLD AUTO: 2.3 K/UL (ref 1–4.8)
LYMPHOCYTES NFR BLD: 19.8 % (ref 18–48)
MCH RBC QN AUTO: 28.4 PG (ref 27–31)
MCHC RBC AUTO-ENTMCNC: 32.2 G/DL (ref 32–36)
MCV RBC AUTO: 88 FL (ref 82–98)
METHADONE UR QL SCN>300 NG/ML: NEGATIVE
MONOCYTES # BLD AUTO: 0.8 K/UL (ref 0.3–1)
MONOCYTES NFR BLD: 7.3 % (ref 4–15)
NEUTROPHILS # BLD AUTO: 8.1 K/UL (ref 1.8–7.7)
NEUTROPHILS NFR BLD: 70.3 % (ref 38–73)
NRBC BLD-RTO: 0 /100 WBC
OPIATES UR QL SCN: NEGATIVE
PCP UR QL SCN>25 NG/ML: NEGATIVE
PLATELET # BLD AUTO: 239 K/UL (ref 150–350)
PMV BLD AUTO: 11.6 FL (ref 9.2–12.9)
RBC # BLD AUTO: 2.89 M/UL (ref 4–5.4)
TOXICOLOGY INFORMATION: NORMAL
WBC # BLD AUTO: 11.51 K/UL (ref 3.9–12.7)

## 2020-02-10 PROCEDURE — 85025 COMPLETE CBC W/AUTO DIFF WBC: CPT

## 2020-02-10 PROCEDURE — 80307 DRUG TEST PRSMV CHEM ANLYZR: CPT

## 2020-02-10 PROCEDURE — 86901 BLOOD TYPING SEROLOGIC RH(D): CPT

## 2020-02-10 PROCEDURE — 99211 OFF/OP EST MAY X REQ PHY/QHP: CPT

## 2020-02-10 PROCEDURE — 63600175 PHARM REV CODE 636 W HCPCS: Performed by: ADVANCED PRACTICE MIDWIFE

## 2020-02-10 PROCEDURE — 11000001 HC ACUTE MED/SURG PRIVATE ROOM

## 2020-02-10 PROCEDURE — 25000003 PHARM REV CODE 250: Performed by: ADVANCED PRACTICE MIDWIFE

## 2020-02-10 PROCEDURE — 59409 PR OBSTETRICAL CARE,VAG DELIV ONLY: ICD-10-PCS | Mod: GB,,, | Performed by: ADVANCED PRACTICE MIDWIFE

## 2020-02-10 PROCEDURE — 59409 OBSTETRICAL CARE: CPT | Mod: GB,,, | Performed by: ADVANCED PRACTICE MIDWIFE

## 2020-02-10 PROCEDURE — 59025 FETAL NON-STRESS TEST: CPT

## 2020-02-10 PROCEDURE — 72200004 HC VAGINAL DELIVERY LEVEL I

## 2020-02-10 PROCEDURE — 72100002 HC LABOR CARE, 1ST 8 HOURS

## 2020-02-10 RX ORDER — OXYTOCIN/RINGER'S LACTATE 30/500 ML
95 PLASTIC BAG, INJECTION (ML) INTRAVENOUS ONCE
Status: COMPLETED | OUTPATIENT
Start: 2020-02-10 | End: 2020-02-10

## 2020-02-10 RX ORDER — ACETAMINOPHEN 325 MG/1
650 TABLET ORAL EVERY 6 HOURS PRN
Status: DISCONTINUED | OUTPATIENT
Start: 2020-02-10 | End: 2020-02-12 | Stop reason: HOSPADM

## 2020-02-10 RX ORDER — SODIUM CHLORIDE 9 MG/ML
INJECTION, SOLUTION INTRAVENOUS
Status: DISCONTINUED | OUTPATIENT
Start: 2020-02-10 | End: 2020-02-10

## 2020-02-10 RX ORDER — ONDANSETRON 4 MG/1
8 TABLET, ORALLY DISINTEGRATING ORAL EVERY 8 HOURS PRN
Status: DISCONTINUED | OUTPATIENT
Start: 2020-02-10 | End: 2020-02-10

## 2020-02-10 RX ORDER — IBUPROFEN 600 MG/1
600 TABLET ORAL EVERY 6 HOURS
Status: DISCONTINUED | OUTPATIENT
Start: 2020-02-11 | End: 2020-02-12 | Stop reason: HOSPADM

## 2020-02-10 RX ORDER — DIPHENHYDRAMINE HYDROCHLORIDE 50 MG/ML
25 INJECTION INTRAMUSCULAR; INTRAVENOUS EVERY 4 HOURS PRN
Status: DISCONTINUED | OUTPATIENT
Start: 2020-02-10 | End: 2020-02-12 | Stop reason: HOSPADM

## 2020-02-10 RX ORDER — HYDROCODONE BITARTRATE AND ACETAMINOPHEN 7.5; 325 MG/1; MG/1
1 TABLET ORAL EVERY 4 HOURS PRN
Status: DISCONTINUED | OUTPATIENT
Start: 2020-02-10 | End: 2020-02-12 | Stop reason: HOSPADM

## 2020-02-10 RX ORDER — OXYTOCIN/RINGER'S LACTATE 30/500 ML
95 PLASTIC BAG, INJECTION (ML) INTRAVENOUS ONCE
Status: DISCONTINUED | OUTPATIENT
Start: 2020-02-10 | End: 2020-02-10

## 2020-02-10 RX ORDER — CALCIUM CARBONATE 200(500)MG
500 TABLET,CHEWABLE ORAL 3 TIMES DAILY PRN
Status: DISCONTINUED | OUTPATIENT
Start: 2020-02-10 | End: 2020-02-10

## 2020-02-10 RX ORDER — PRENATAL WITH FERROUS FUM AND FOLIC ACID 3080; 920; 120; 400; 22; 1.84; 3; 20; 10; 1; 12; 200; 27; 25; 2 [IU]/1; [IU]/1; MG/1; [IU]/1; MG/1; MG/1; MG/1; MG/1; MG/1; MG/1; UG/1; MG/1; MG/1; MG/1; MG/1
1 TABLET ORAL DAILY
Status: DISCONTINUED | OUTPATIENT
Start: 2020-02-11 | End: 2020-02-12 | Stop reason: HOSPADM

## 2020-02-10 RX ORDER — HYDROCORTISONE 25 MG/G
CREAM TOPICAL 3 TIMES DAILY PRN
Status: DISCONTINUED | OUTPATIENT
Start: 2020-02-10 | End: 2020-02-12 | Stop reason: HOSPADM

## 2020-02-10 RX ORDER — SIMETHICONE 80 MG
1 TABLET,CHEWABLE ORAL 4 TIMES DAILY PRN
Status: DISCONTINUED | OUTPATIENT
Start: 2020-02-10 | End: 2020-02-10

## 2020-02-10 RX ORDER — ACETAMINOPHEN 500 MG
500 TABLET ORAL EVERY 6 HOURS PRN
Status: DISCONTINUED | OUTPATIENT
Start: 2020-02-10 | End: 2020-02-10

## 2020-02-10 RX ORDER — DIPHENHYDRAMINE HCL 25 MG
25 CAPSULE ORAL EVERY 4 HOURS PRN
Status: DISCONTINUED | OUTPATIENT
Start: 2020-02-10 | End: 2020-02-12 | Stop reason: HOSPADM

## 2020-02-10 RX ORDER — OXYTOCIN/RINGER'S LACTATE 30/500 ML
334 PLASTIC BAG, INJECTION (ML) INTRAVENOUS ONCE
Status: DISCONTINUED | OUTPATIENT
Start: 2020-02-10 | End: 2020-02-10

## 2020-02-10 RX ORDER — ONDANSETRON 4 MG/1
8 TABLET, ORALLY DISINTEGRATING ORAL EVERY 8 HOURS PRN
Status: DISCONTINUED | OUTPATIENT
Start: 2020-02-10 | End: 2020-02-10 | Stop reason: SDUPTHER

## 2020-02-10 RX ORDER — ONDANSETRON 8 MG/1
8 TABLET, ORALLY DISINTEGRATING ORAL EVERY 8 HOURS PRN
Status: DISCONTINUED | OUTPATIENT
Start: 2020-02-10 | End: 2020-02-12 | Stop reason: HOSPADM

## 2020-02-10 RX ORDER — HYDROCODONE BITARTRATE AND ACETAMINOPHEN 5; 325 MG/1; MG/1
1 TABLET ORAL EVERY 4 HOURS PRN
Status: DISCONTINUED | OUTPATIENT
Start: 2020-02-10 | End: 2020-02-12 | Stop reason: HOSPADM

## 2020-02-10 RX ORDER — SIMETHICONE 80 MG
1 TABLET,CHEWABLE ORAL EVERY 6 HOURS PRN
Status: DISCONTINUED | OUTPATIENT
Start: 2020-02-10 | End: 2020-02-12 | Stop reason: HOSPADM

## 2020-02-10 RX ORDER — BUTORPHANOL TARTRATE 2 MG/ML
1 INJECTION INTRAMUSCULAR; INTRAVENOUS
Status: DISCONTINUED | OUTPATIENT
Start: 2020-02-10 | End: 2020-02-10

## 2020-02-10 RX ORDER — DOCUSATE SODIUM 100 MG/1
200 CAPSULE, LIQUID FILLED ORAL 2 TIMES DAILY PRN
Status: DISCONTINUED | OUTPATIENT
Start: 2020-02-10 | End: 2020-02-12 | Stop reason: HOSPADM

## 2020-02-10 RX ADMIN — IBUPROFEN 600 MG: 600 TABLET ORAL at 09:02

## 2020-02-10 RX ADMIN — Medication 334 MILLI-UNITS/MIN: at 06:02

## 2020-02-10 RX ADMIN — SODIUM CHLORIDE, SODIUM LACTATE, POTASSIUM CHLORIDE, AND CALCIUM CHLORIDE 1000 ML: .6; .31; .03; .02 INJECTION, SOLUTION INTRAVENOUS at 10:02

## 2020-02-10 RX ADMIN — HYDROCODONE BITARTRATE AND ACETAMINOPHEN 1 TABLET: 5; 325 TABLET ORAL at 06:02

## 2020-02-10 NOTE — H&P
[facility]  Obstetrics  History & Physical    Patient Name: Joanna Reyna  MRN: [id number]  Admission Date: 2/10/2020  Primary Care Provider: [facility]    Subjective:     Principal Problem:Threatened labor at term    History of Present Illness:  No notes on file    Obstetric HPI:  Patient reports  Contractions q 2-3 minutes this morning, active fetal movement, No vaginal bleeding , No loss of fluid     This pregnancy has been complicated by THC use, CF carrier, FOB negative, hx of tx Chlamydia      OB History    Para Term  AB Living   3 2 2 0 0 2   SAB TAB Ectopic Multiple Live Births   0 0 0 0 2      # Outcome Date GA Lbr Rashawn/2nd Weight Sex Delivery Anes PTL Lv   3 Current            2 Term 13 40w0d  2.381 kg (5 lb 4 oz) M Vag-Spont EPI  AGATHA   1 Term 11 40w0d  3.629 kg (8 lb) M Vag-Spont EPI  AGATHA     Past Medical History:   Diagnosis Date    Depression      History reviewed. No pertinent surgical history.    PTA Medications   Medication Sig    aspirin-sod bicarb-citric acid (DWIGHT-SELTZER) 324 mg TbEF Take 325 mg by mouth every 6 (six) hours as needed.    prenatal vit calc,iron,folic (PRENATAL VITAMIN ORAL) Take by mouth.       Review of patient's allergies indicates:  No Known Allergies     Family History     Problem Relation (Age of Onset)    Colon cancer Paternal Grandfather    Ovarian cancer Mother        Tobacco Use    Smoking status: Never Smoker    Smokeless tobacco: Never Used   Substance and Sexual Activity    Alcohol use: Not Currently    Drug use: Never    Sexual activity: Yes     Partners: Male     Birth control/protection: None     Comment: single     Review of Systems   Gastrointestinal: Positive for abdominal pain.   Genitourinary: Negative for vaginal bleeding.   All other systems reviewed and are negative.     Objective:     Vital Signs (Most Recent):  Temp: 98.5 °F (36.9 °C) (02/10/20 1003)  Pulse: 90 (02/10/20 0955)  Resp: 18 (02/10/20  1001)  BP: (!) 103/59 (02/10/20 0955) Vital Signs (24h Range):  Temp:  [98.5 °F (36.9 °C)] 98.5 °F (36.9 °C)  Pulse:  [90] 90  Resp:  [18] 18  BP: (103)/(59) 103/59     Weight: 72.1 kg (159 lb)  Body mass index is 29.08 kg/m².    FHT: 130Cat 2 (reassuring) isolated late deceleration noted   TOCO:  Q 2-3 minutes    Physical Exam:   Constitutional: She is oriented to person, place, and time. She appears well-developed and well-nourished.    HENT:   Head: Normocephalic.    Eyes: Pupils are equal, round, and reactive to light.    Neck: Normal range of motion.    Cardiovascular: Normal rate and regular rhythm.     Pulmonary/Chest: Effort normal.        Abdominal: Soft.     Genitourinary: Vagina normal and uterus normal.           Musculoskeletal: Normal range of motion.       Neurological: She is alert and oriented to person, place, and time.    Skin: Skin is warm and dry.    Psychiatric: She has a normal mood and affect. Her behavior is normal. Judgment and thought content normal.       Cervix:  Dilation:  1.5  Effacement:  50%  Station: -3  Presentation:      Significant Labs:  Lab Results   Component Value Date    GROUPTRH O POS 2019    HEPBSAG Negative 2019    STREPBCULT No Group B Streptococcus isolated 2020       I have personallly reviewed all pertinent lab results from the last 24 hours.    Assessment/Plan:     29 y.o. female  at 40w0d for:    Post term pregnancy over 40 weeks  Admit, IV Fluids, IOL if cervix is unchanged D/T FHR tracing, GBS negative       Leah Salcedo CNM  Obstetrics  [facility]

## 2020-02-10 NOTE — SUBJECTIVE & OBJECTIVE
Obstetric HPI:  Patient reports  Contractions q 2-3 minutes this morning, active fetal movement, No vaginal bleeding , No loss of fluid     This pregnancy has been complicated by THC use, CF carrier, FOB negative, hx of tx Chlamydia      OB History    Para Term  AB Living   3 2 2 0 0 2   SAB TAB Ectopic Multiple Live Births   0 0 0 0 2      # Outcome Date GA Lbr Rashawn/2nd Weight Sex Delivery Anes PTL Lv   3 Current            2 Term 13 40w0d  2.381 kg (5 lb 4 oz) M Vag-Spont EPI  AGATHA   1 Term 11 40w0d  3.629 kg (8 lb) M Vag-Spont EPI  AGATHA     Past Medical History:   Diagnosis Date    Depression      History reviewed. No pertinent surgical history.    PTA Medications   Medication Sig    aspirin-sod bicarb-citric acid (DWIGHT-SELTZER) 324 mg TbEF Take 325 mg by mouth every 6 (six) hours as needed.    prenatal vit calc,iron,folic (PRENATAL VITAMIN ORAL) Take by mouth.       Review of patient's allergies indicates:  No Known Allergies     Family History     Problem Relation (Age of Onset)    Colon cancer Paternal Grandfather    Ovarian cancer Mother        Tobacco Use    Smoking status: Never Smoker    Smokeless tobacco: Never Used   Substance and Sexual Activity    Alcohol use: Not Currently    Drug use: Never    Sexual activity: Yes     Partners: Male     Birth control/protection: None     Comment: single     Review of Systems   Gastrointestinal: Positive for abdominal pain.   Genitourinary: Negative for vaginal bleeding.   All other systems reviewed and are negative.     Objective:     Vital Signs (Most Recent):  Temp: 98.5 °F (36.9 °C) (02/10/20 1003)  Pulse: 90 (02/10/20 0955)  Resp: 18 (02/10/20 1001)  BP: (!) 103/59 (02/10/20 0955) Vital Signs (24h Range):  Temp:  [98.5 °F (36.9 °C)] 98.5 °F (36.9 °C)  Pulse:  [90] 90  Resp:  [18] 18  BP: (103)/(59) 103/59     Weight: 72.1 kg (159 lb)  Body mass index is 29.08 kg/m².    FHT: 130Cat 2 (reassuring) isolated late deceleration noted    TOCO:  Q 2-3 minutes    Physical Exam:   Constitutional: She is oriented to person, place, and time. She appears well-developed and well-nourished.    HENT:   Head: Normocephalic.    Eyes: Pupils are equal, round, and reactive to light.    Neck: Normal range of motion.    Cardiovascular: Normal rate and regular rhythm.     Pulmonary/Chest: Effort normal.        Abdominal: Soft.     Genitourinary: Vagina normal and uterus normal.           Musculoskeletal: Normal range of motion.       Neurological: She is alert and oriented to person, place, and time.    Skin: Skin is warm and dry.    Psychiatric: She has a normal mood and affect. Her behavior is normal. Judgment and thought content normal.       Cervix:  Dilation:  1.5  Effacement:  50%  Station: -3  Presentation:      Significant Labs:  Lab Results   Component Value Date    GROUPTRH O POS 07/18/2019    HEPBSAG Negative 07/18/2019    STREPBCULT No Group B Streptococcus isolated 01/16/2020       I have personallly reviewed all pertinent lab results from the last 24 hours.

## 2020-02-10 NOTE — PROGRESS NOTES
Patient cleared for Anesthesia: Epidural    Anesthesia/Surgery risks, benefits, and alternative options discussed and understood by patient/fa

## 2020-02-10 NOTE — ANESTHESIA PREPROCEDURE EVALUATION
02/10/2020  Joanna Reyna is a 29 y.o., female.    Anesthesia Evaluation    I have reviewed the Patient Summary Reports.    I have reviewed the Nursing Notes.   I have reviewed the Medications.     Review of Systems  Anesthesia Hx:  No problems with previous Anesthesia    Social:  Non-Smoker, No Alcohol Use    Hematology/Oncology:  Hematology Normal   Oncology Normal     EENT/Dental:EENT/Dental Normal   Cardiovascular:  Cardiovascular Normal Exercise tolerance: good     Pulmonary:  Pulmonary Normal    Renal/:  Renal/ Normal     Hepatic/GI:  Hepatic/GI Normal    Musculoskeletal:  Musculoskeletal Normal    Neurological:  Neurology Normal    Endocrine:  Endocrine Normal    Dermatological:  Skin Normal    Psych:   Psychiatric History          Physical Exam  General:  Well nourished    Airway/Jaw/Neck:  Jaw/Neck Findings: Neck ROM: Normal ROM      Dental:  Dental Findings: In tact        Mental Status:  Mental Status Findings:  Cooperative         Anesthesia Plan  Type of Anesthesia, risks & benefits discussed:  Anesthesia Type:  epidural  Patient's Preference:   Intra-op Monitoring Plan: standard ASA monitors  Intra-op Monitoring Plan Comments:   Post Op Pain Control Plan: epidural analgesia  Post Op Pain Control Plan Comments:   Induction:   IV  Beta Blocker:         Informed Consent: Patient understands risks and agrees with Anesthesia plan.  Questions answered. Anesthesia consent signed with patient.  ASA Score: 2     Day of Surgery Review of History & Physical:            Ready For Surgery From Anesthesia Perspective.

## 2020-02-11 PROCEDURE — 99238 PR HOSPITAL DISCHARGE DAY,<30 MIN: ICD-10-PCS | Mod: ,,, | Performed by: OBSTETRICS & GYNECOLOGY

## 2020-02-11 PROCEDURE — 25000003 PHARM REV CODE 250: Performed by: ADVANCED PRACTICE MIDWIFE

## 2020-02-11 PROCEDURE — 99024 PR POST-OP FOLLOW-UP VISIT: ICD-10-PCS | Mod: ,,, | Performed by: OBSTETRICS & GYNECOLOGY

## 2020-02-11 PROCEDURE — 11000001 HC ACUTE MED/SURG PRIVATE ROOM

## 2020-02-11 PROCEDURE — 99238 HOSP IP/OBS DSCHRG MGMT 30/<: CPT | Mod: ,,, | Performed by: OBSTETRICS & GYNECOLOGY

## 2020-02-11 PROCEDURE — 99024 POSTOP FOLLOW-UP VISIT: CPT | Mod: ,,, | Performed by: OBSTETRICS & GYNECOLOGY

## 2020-02-11 RX ORDER — IBUPROFEN 600 MG/1
600 TABLET ORAL 3 TIMES DAILY
Qty: 30 TABLET | Refills: 2 | Status: SHIPPED | OUTPATIENT
Start: 2020-02-11

## 2020-02-11 RX ADMIN — IBUPROFEN 600 MG: 600 TABLET ORAL at 05:02

## 2020-02-11 RX ADMIN — IBUPROFEN 600 MG: 600 TABLET ORAL at 11:02

## 2020-02-11 RX ADMIN — HYDROCODONE BITARTRATE AND ACETAMINOPHEN 1 TABLET: 5; 325 TABLET ORAL at 01:02

## 2020-02-11 RX ADMIN — PRENATAL VITAMINS-IRON FUMARATE 27 MG IRON-FOLIC ACID 0.8 MG TABLET 1 TABLET: at 08:02

## 2020-02-11 RX ADMIN — DOCUSATE SODIUM 200 MG: 100 CAPSULE, LIQUID FILLED ORAL at 09:02

## 2020-02-11 RX ADMIN — IBUPROFEN 600 MG: 600 TABLET ORAL at 04:02

## 2020-02-11 RX ADMIN — HYDROCODONE BITARTRATE AND ACETAMINOPHEN 1 TABLET: 5; 325 TABLET ORAL at 08:02

## 2020-02-11 RX ADMIN — HYDROCODONE BITARTRATE AND ACETAMINOPHEN 1 TABLET: 5; 325 TABLET ORAL at 09:02

## 2020-02-11 NOTE — PLAN OF CARE
Mother and baby bonding well. Pt is ambulating in room, voiding without difficulty, and performing own self care. She is tolerating a regular diet without emesis. Pain is being controlled with the use of PRN medications. Vitals have been stable. Pt states no needs or concerns at this time. TELMA.

## 2020-02-11 NOTE — PLAN OF CARE
Pt stable, VS WNL. Self vanessa-care.  Fundus firm, midline 1 below.  Light bleeding.  Frequent voiding encouraged.  Tolerating regular diet.  Significant other at bedside, attentive to pt and baby.  Pt bonding with baby.  Ambulating around room well, independently.  Pt attentive to baby's feeing cues.  Utilizing feeding log.  Pt c/o pain, addressed with ordered pain meds. Pt doing well.  Pt denies any needs at this time.

## 2020-02-11 NOTE — DISCHARGE SUMMARY
[facility] Mother Baby Unit  Obstetrics  Discharge Summary      Patient Name: Joanna Reyna  MRN: [id number]  Admission Date: 2/10/2020  Hospital Length of Stay: 2 days  Discharge Date and Time:  2020 8:55 AM  Attending Physician: Jorge Shannon MD   Discharging Provider: Leigh Arellano MD   Primary Care Provider: [facility]    HPI: CO CTX         * No surgery found *     Hospital Course:   Admit, AROM, KEVAN PRN, Breast , male, dc this PM          Final Active Diagnoses:    Diagnosis Date Noted POA    PRINCIPAL PROBLEM:   (normal spontaneous vaginal delivery) [O80] 02/10/2020 Not Applicable    Threatened labor at term [O47.9] 02/10/2020 Yes    Post term pregnancy over 40 weeks [O48.0] 02/10/2020 Yes      Problems Resolved During this Admission:        Labs: All labs within the past 24 hours have been reviewed    Feeding Method: breast    Immunizations     None          Delivery:    Episiotomy: None   Lacerations: None   Repair suture:     Repair # of packets:     Blood loss (ml): 0     Birth information:  YOB: 2022   Time of birth: 6:18 PM   Sex: male   Delivery type: Vaginal, Spontaneous   Gestational Age: 40w0d    Delivery Clinician:      Other providers:       Additional  information:  Forceps:    Vacuum:    Breech:    Observed anomalies      Living?:           APGARS  One minute Five minutes Ten minutes   Skin color:         Heart rate:         Grimace:         Muscle tone:         Breathing:         Totals: 7  9        Placenta: Delivered:       appearance    Pending Diagnostic Studies:     None          Discharged Condition: good    Disposition: Home or Self Care    Follow Up:  Follow-up Information     Follow up In 2 weeks.           Jorge Shannon MD.    Specialties:  Obstetrics, Obstetrics and Gynecology  Contact information:  [place] [id number]  [phone]                 Patient Instructions:      Notify your health care provider if you  experience any of the following:  temperature >100.4     Notify your health care provider if you experience any of the following:  persistent nausea and vomiting or diarrhea     Notify your health care provider if you experience any of the following:  severe uncontrolled pain     Call MD for:  temperature >100.4     Call MD for:  persistent nausea and vomiting or diarrhea     Call MD for:  severe uncontrolled pain     Call MD for:  redness, tenderness, or signs of infection (pain, swelling, redness, odor or green/yellow discharge around incision site)     Call MD for:  difficulty breathing or increased cough     Call MD for:  severe persistent headache     Call MD for:  persistent dizziness, light-headedness, or visual disturbances     Call MD for:   Order Comments: Obstetric patients: Call for decreased fetal movement, regular uterine contractions, leakage of fluid, vaginal bleeding or any other concerns  Gynecology patients: Call for incisional drainage, redness, or tenderness, abnormal vaginal bleeding or discharge or any other concerns     No dressing needed     Medications:  Current Discharge Medication List      START taking these medications    Details   ibuprofen (ADVIL,MOTRIN) 600 MG tablet Take 1 tablet (600 mg total) by mouth 3 (three) times daily.  Qty: 30 tablet, Refills: 2         CONTINUE these medications which have NOT CHANGED    Details   aspirin-sod bicarb-citric acid (DWIGHT-SELTZER) 324 mg TbEF Take 325 mg by mouth every 6 (six) hours as needed.      prenatal vit calc,iron,folic (PRENATAL VITAMIN ORAL) Take by mouth.             Leigh Arellano MD  Obstetrics  [place] [facility]

## 2020-02-11 NOTE — LACTATION NOTE
This note was copied from a baby's chart.  Assessed first feeding immediately after birth. Education provided on latch, positioning,milk transfer and importance of baby led feeding on cue (8 or more times daily) and use of hand expression. LATCH score complete. Reviewed the risk associated with use of pacifiers and reasons to avoid artificial nipples. Encouraged mother to use Breastfeeding Guide to track feedings and output. Questions/ Concerns answered. Mother verbalizes understanding.

## 2020-02-11 NOTE — L&D DELIVERY NOTE
[facility]  Vaginal Delivery   Operative Note    SUMMARY     Normal spontaneous vaginal delivery of live infant, was placed on mothers abdomen for skin to skin and bulb suctioning performed.  Infant delivered position JUSTINO, MC ENGLE AND SUPRA PUBIC DONE WITH DEL OF ANTERIOR SHOULDERS  over intact perineum.  Nuchal cord: Yes, cord reduced at perineum.    Spontaneous delivery of placenta and IV pitocin given noting good uterine tone.  No lacerations noted.  Patient tolerated delivery well. Sponge needle and lap counted correctly x2.    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, antepartum    History of chlamydia    Threatened labor at term    Post term pregnancy over 40 weeks     (normal spontaneous vaginal delivery)     Admitting GA: 40w0d    Delivery Information for Juan Reyna    Birth information:  YOB: 2022   Time of birth: 6:18 PM   Sex: male   Head Delivery Date/Time: 2/10/2020  6:18 PM   Delivery type: Vaginal, Spontaneous   Gestational Age: 40w0d    Delivery Providers    Delivering clinician:  Leah Salcedo CNM   Provider Role    Citlalli Puckett RN Delivery Nurse    Shabnam Sahu, RN Registered Nurse    Lavern Sanchez, [facility]            Measurements    Weight:    Length:           Apgars    Living status:  Living  Apgars:   1 min.:   5 min.:   10 min.:   15 min.:   20 min.:     Skin color:   0  1       Heart rate:   2  2       Reflex irritability:   2  2       Muscle tone:   2  2       Respiratory effort:   1  2       Total:   7  9       Apgars assigned by:  SHABNAM SAHU RN         Operative Delivery    Forceps attempted?:  No  Vacuum extractor attempted?:  No         Shoulder Dystocia    Shoulder dystocia present?:  Yes  Anterior shoulder:  right  Time recognized:  2/10/2020  18:18:00  First maneuver:  Dwayne maneuver, suprapubic pressure  First maneuver performed at:  2/10/2020 18:18:00  First maneuver performed by:  Julianne Irvin ST           Presentation    Presentation:  Vertex  Position:  Left Occiput Anterior           Interventions/Resuscitation    Method:  Tactile Stimulation       Cord    Vessels:  3 vessels  Complications:  Nuchal  Nuchal Intervention:  reduced  Nuchal Cord Description:  loose nuchal cord  Number of Loops:  1  Delayed Cord Clamping?:  Yes  Cord Clamped Date/Time:  2/10/2020  6:21 PM  Cord Blood Disposition:  Lab  Gases Sent?:  No  Stem Cell Collection (by MD):  No       Placenta    Placenta delivery date/time:  2/10/2020 1826  Placenta removal:  Spontaneous  Placenta appearance:  Intact  Placenta disposition:  family           Labor Events:       labor: No     Labor Onset Date/Time:         Dilation Complete Date/Time:         Start Pushing Date/Time: 02/10/2020 18:13       Start Pushing Date/Time: 02/10/2020 18:13     Rupture Date/Time: 02/10/20  1630         Rupture type:           Fluid Amount:        Fluid Color: Clear      Fluid Odor:        Membrane Status: ARM (Artificial Rupture)               steroids: None     Antibiotics given for GBS: No     Induction: none     Indications for induction:        Augmentation:       Indications for augmentation:       Labor complications: None     Additional complications:          Cervical ripening:                     Delivery:      Episiotomy: None     Indication for Episiotomy:       Perineal Lacerations: None Repaired:      Periurethral Laceration:   Repaired:     Labial Laceration:   Repaired:     Sulcus Laceration:   Repaired:     Vaginal Laceration:   Repaired:     Cervical Laceration:   Repaired:     Repair suture:       Repair # of packets:       Last Value - EBL - Nursing (mL): 0     Sum - EBL - Nursing (mL): 0     Last Value - EBL - Anesthesia (mL):      Calculated  QBL (mL): 113      Vaginal Sweep Performed: No     Surgicount Correct: Yes       Other providers:       Anesthesia    Method:  None          Details (if applicable):  Trial of Labor      Categorization:      Priority:     Indications for :     Incision Type:       Additional  information:  Forceps:    Vacuum:    Breech:    Observed anomalies    Other (Comments):

## 2020-02-11 NOTE — PLAN OF CARE
Quiet time initiated. Phone and call bell within pt's reach. Pt states no needs or concerns at present. TELMA.

## 2020-02-11 NOTE — NURSING TRANSFER
Pt transferred to MB unit via WC in stable condition. Accompanied by SO with infant in arms. Report given to BREANA Roe.

## 2020-02-12 VITALS
HEART RATE: 77 BPM | RESPIRATION RATE: 18 BRPM | OXYGEN SATURATION: 98 % | DIASTOLIC BLOOD PRESSURE: 50 MMHG | WEIGHT: 159 LBS | HEIGHT: 62 IN | TEMPERATURE: 96 F | SYSTOLIC BLOOD PRESSURE: 92 MMHG | BODY MASS INDEX: 29.26 KG/M2

## 2020-02-12 PROCEDURE — 25000003 PHARM REV CODE 250: Performed by: ADVANCED PRACTICE MIDWIFE

## 2020-02-12 RX ADMIN — IBUPROFEN 600 MG: 600 TABLET ORAL at 12:02

## 2020-02-12 RX ADMIN — DOCUSATE SODIUM 200 MG: 100 CAPSULE, LIQUID FILLED ORAL at 09:02

## 2020-02-12 RX ADMIN — PRENATAL VITAMINS-IRON FUMARATE 27 MG IRON-FOLIC ACID 0.8 MG TABLET 1 TABLET: at 09:02

## 2020-02-12 RX ADMIN — IBUPROFEN 600 MG: 600 TABLET ORAL at 06:02

## 2020-02-12 NOTE — PLAN OF CARE
Pt stable, VS WNL.  Self vanessa-care.  Fundus firm, midline,  1 below.  Light bleeding.  Frequent voiding encouraged.  Tolerating regular diet.  Ambulates around room well, independently.  Significant other at bedside, attentive to pt and baby.  Pt bonding well with baby.  Pt c/o pain, addressed with ordered pain meds.  Pt doing well.  Denies any needs at this time.

## 2020-02-12 NOTE — LACTATION NOTE
This note was copied from a baby's chart.  Mother does well feeding and latching baby on own.  Mother has good positioning with baby.  Baby feeds for a good amount of time.  Mother is confident with feedings and doing well.

## 2020-02-12 NOTE — LACTATION NOTE
This note was copied from a baby's chart.     02/12/20 0900   Maternal Information   Date of Referral 02/12/20   Person Making Referral nurse practitioner   Maternal Reason for Referral substance use   Maternal Assessment   Breast Size Issue none   Breast Shape Bilateral:;round   Breast Density Bilateral:;filling   Areola Bilateral:;elastic   Nipples Bilateral:;everted;graspable   Maternal Infant Feeding   Maternal Emotional State relaxed;assist needed   Infant Positioning clutch/football   Signs of Milk Transfer audible swallow;infant jaw motion present;suck/swallow ratio   Comfort Measures Following Feeding air-drying encouraged;expressed milk applied;soap use discouraged;water cleansing encouraged   Latch Assistance yes   Equipment Type   Breast Pump Type other (see comments)  (confirmed mother has a medela @ home)   Lactation Referrals   Lactation Referrals outpatient lactation program;support group   Outpatient Lactation Program Lactation Follow-up Date/Time discussed options   Support Group Lactation Follow-up Date/Time 2020 flyers     Used Breastfeeding Guide and reviewed first alert form, importance/ benefits of exclusive breastfeeding for 6 months, proper handling and storage of breast milk, and all resources available after leaving the hospital. Questions/ Concerns answered. Mother verbalized understanding.   Assessed feeding to left after mother states having more difficulty to that side. Education provided on latch, positioning,milk transfer and importance of baby led feeding on cue (8 or more times daily) and use of hand expression. LATCH score complete. Reviewed the risk associated with use of pacifiers and reasons to avoid artificial nipples. Encouraged mother to use Breastfeeding Guide to track feedings and output. Questions/ Concerns answered. Mother verbalizes understanding.   Mom & baby with positive UDS for THC during pregnancy. Breastfeeding ENCOURAGED but drug use DISCOURAGED. Mom states  understanding.

## 2020-02-12 NOTE — PLAN OF CARE
Stable condition. VS WNL. Lungs clear. Pain is being managed with oral medications. Self vanessa-care and reports light vaginal bleeding. Fundus firm without massage. Voiding without difficulty. Tolerating regular diet and drinking adequate amounts of fluids. Bonding appropriately with .      LACTATION NOTE: Pt was seen this am by LETTY Gutiérrez, RN,IBLC. Denied needing assistance with BF.     Reviewed Breastfeeding Guide and first alert form, importance/ benefits of exclusive breastfeeding for 6 months, proper handling and storage of breast milk, and all resources available after leaving the hospital. Questions/ Concerns answered. Mother verbalized understanding.      Discharge instructions given. F/U with TAWNY Salcedo on  for 2 week post partum check up. Voiced understanding. Received a copy of discharge paperwork including Post Partum and POST BIRTH handouts.

## 2020-02-12 NOTE — DISCHARGE INSTRUCTIONS
Postpartum Discharge Instructions:    · No heavy lifting, straining, frequent rest periods  · Pelvic rest--no douching, tampons, or intercourse until released by MD  · Talk to your doctor about birth control--remember breastfeeding is not a method of birth control  · Notify MD if bleeding becomes heavier than usual and if large clots, painful cramping,or foul odor develops.   Vaginal discharge will lighten and decrease in amount gradually.    · Cleanse perineal area from front to back after urination or having a bowel movement.  · Tub soak or portable sitz bath at home.  Apply clean pad with Epifoam and Tucks to perineal area.  · If not breastfeeding, wear tight fitting sports bra for 1 week--remove only to bathe  · Remember to keep your breast clean and dry to prevent any cracking  · Notify MD if breast become reddened,swollen, nipples bleed or crack, or fever greater than 100.4  · Notify MD of pain, swelling,  Redness, or heat developing in back of leg especially when foot is flexed toward body  · Notify MD of pain not relieved by pain medication.  ·    Call MD or go to ER for any concerns        After a Vaginal Birth  After having a baby, your body may be very tired. It can take time to recover from a vaginal delivery. You may stay in the hospital or birth center from 1 to 4 days. In some cases, you may be able to go home the same day.    Right after the delivery  Your temperature and blood pressure will be taken until they are stable. A nurse or other healthcare provider will observe you as you rest. You may have afterbirth pains. These are cramps caused by the uterus shrinking. Sanitary pads are used to soak up the discharge of the uterine lining. To make sure that you arent bleeding too much, the pad will be checked. And the firmness of your uterus will be checked. To do this, a nurse will gently push down on your stomach. If you had anesthesia, youll be watched closely until you can feel and move your toes.  If you have perineal pain (pain between the vagina and anus), an ice pack can help.  [place] care  While still in the hospital or birth center, youll learn how to hold and feed your baby. You will also be given instructions on how to care for your baby. This includes bathing and feeding.   Preparing to go home  You may be anxious to go home as soon as possible. Before you and your baby go home, a healthcare provider will check to be sure you are healthy enough to take care of your baby and yourself. Youre ready to go home when:  · You can walk to the bathroom and use the bathroom without help.  · You can eat solid food and swallow pills (if needed).  · You have no sign of infection or other health problems, including fever.   · You have adequate pain control.  · Your bleeding isn't excessive.  · You are able to care for your  and are emotionally stable.  Before leaving the hospital or birth center, youll be given written instructions for home self-care after vaginal delivery. Be sure to follow these instructions carefully. If you have questions or concerns, talk about them now.  If you have stitches  You may have received stitches in the skin near your vagina. The stitches might have closed an episiotomy (an incision that enlarges the opening of the vagina). Or you may have needed stitches to repair torn skin. Either way, your stitches should dissolve within weeks. Until then, you can help reduce discomfort, aid healing, and reduce your risk of infection by keeping the stitches clean. These tips can help:  · Gently wipe from front to back after you urinate or have a bowel movement.  · After wiping, spray warm water on the area. Or you can have a sitz bath. This means sitting in a tub with a few inches of water in it. Then pat the area dry or use a hairdryer on a cool setting.  · Do not use soap or any solution except water on the area.  · You can take a shower unless told not to.  · Change sanitary pads at  least every 2 to 4 hours.  · Place cold or heat packs on the area as directed by your healthcare providers or nurses. Keep a thin towel between the pack and your skin.  · Sit on firm seats so the stitches pull less.   follow-up  Schedule a  follow-up exam with your healthcare provider for about 6 weeks after delivery. During this exam, your uterus and vaginal area will be checked. Contact your healthcare provider if you think you or your baby are having any problems.  When to call your healthcare provider  Call your health care provider right away if you have:  · A fever of 100.4°F (38.0°C) or higher  · Bleeding that requires a new sanitary pad after an hour, or large blood clots  · Pain in your vagina that gets worse and isn't relieved with medicine  · Swelling, discharge, or increased pain from vaginal tear or episiotomy  · Burning, pain, red streaks, or lumpy areas in your breasts that may be accompanied by flu-like symptoms  · Cracks, blisters, or blood on your nipples  · Burning or pain when you urinate  · Nausea or vomiting  · Dizziness or fainting  · Feelings of extreme sadness or anxiety, or a feeling that you dont want to be with your baby  · Abdominal pain that isnt relieved with medicine  · Vaginal discharge that has a bad odor  · No bowel movement for 5 days  · Painful urination, orinability to control urination  · Redness, warmth, or pain in the lower leg  · Chest pain   Date Last Reviewed: 2015  © 0758-6635 [employer]. [address] [place] [address], [place] [address]. All rights reserved. This information is not intended as a substitute for professional medical care. Always follow your healthcare professional's instructions.

## 2020-02-12 NOTE — NURSING
Dr. Lambert made aware of patient's BP 92/50. Pt states that this is her normal, and her some of her BPs did reflect this range. Ok to proceed with discharge.

## 2020-02-26 ENCOUNTER — POSTPARTUM VISIT (OUTPATIENT)
Dept: OBSTETRICS AND GYNECOLOGY | Facility: CLINIC | Age: 30
End: 2020-02-26
Payer: MEDICAID

## 2020-02-26 VITALS
DIASTOLIC BLOOD PRESSURE: 60 MMHG | RESPIRATION RATE: 18 BRPM | HEIGHT: 62 IN | SYSTOLIC BLOOD PRESSURE: 104 MMHG | HEART RATE: 84 BPM | BODY MASS INDEX: 25.21 KG/M2 | WEIGHT: 137 LBS

## 2020-02-26 PROCEDURE — 99999 PR PBB SHADOW E&M-EST. PATIENT-LVL III: CPT | Mod: PBBFAC,,, | Performed by: ADVANCED PRACTICE MIDWIFE

## 2020-02-26 PROCEDURE — 99213 OFFICE O/P EST LOW 20 MIN: CPT | Mod: PBBFAC | Performed by: ADVANCED PRACTICE MIDWIFE

## 2020-02-26 PROCEDURE — 59430 PR CARE AFTER DELIVERY ONLY: ICD-10-PCS | Mod: TH,,, | Performed by: ADVANCED PRACTICE MIDWIFE

## 2020-02-26 PROCEDURE — 99999 PR PBB SHADOW E&M-EST. PATIENT-LVL III: ICD-10-PCS | Mod: PBBFAC,,, | Performed by: ADVANCED PRACTICE MIDWIFE

## 2020-02-26 NOTE — PROGRESS NOTES
"SUBJECTIVE:  Joanna Reyna is a 29 y.o.  female who presents for a postpartum visit. She is 2 week postpartum following a normal spontaneous vaginal delivery. Pregnancy complications: none. The delivery was at 40 gestational weeks. Postpartum course has been WELL. She is feeling well and happy. Baby's course has been WELL. Baby is feeding by breast. Bleeding staining only. Bowel function is normal. Bladder function is normal. Patient is not sexually active. Contraception method is none. She plans to use oral progesterone-only contraceptive for contraception.  Postpartum depression screening: negative.    Pregnancy #3 Problems (from 17 to present)     No problems associated with this episode.          I have fully reviewed the prenatal and intrapartum course.  The following portions of the patient's history were reviewed and updated as appropriate: allergies, current medications, past family history, past     OB History    Para Term  AB Living   3 3 3     3   SAB TAB Ectopic Multiple Live Births         0 3      # Outcome Date GA Lbr Rashawn/2nd Weight Sex Delivery Anes PTL Lv   3 Term 02/10/20 40w0d  3.805 kg (8 lb 6.2 oz) M Vag-Spont None N AGATHA   2 Term 13 40w0d  2.381 kg (5 lb 4 oz) M Vag-Spont EPI  AGATHA   1 Term 11 40w0d  3.629 kg (8 lb) M Vag-Spont EPI  AGATHA       ROS:  GENERAL: No fever, chills, fatigability or weight loss.  VULVAR: No pain, no lesions and no itching.  VAGINAL: No relaxation, no itching, no discharge, no abnormal bleeding and no lesions.  ABDOMEN: No abdominal pain. Denies nausea. Denies vomiting. No diarrhea. No constipation  BREAST: Denies pain. No lumps. No discharge.  URINARY: No incontinence, no nocturia, no frequency and no dysuria.  CARDIOVASCULAR: No chest pain. No shortness of breath. No leg cramps.  NEUROLOGICAL: No headaches. No vision changes.    OBJECTIVE:   /60   Pulse 84   Resp 18   Ht 5' 2" (1.575 m)   Wt 62.1 kg (137 lb)   LMP 2019 " (Exact Date) The patient is currently breastfeeding.Body mass index is 25.06 kg/m².  Date of last Pap smear: No result found    General Appearance: alert, well appearing, in no apparent distress  Head: normocephalic, atraumatic  Abdomen: benign non-tender, without masses or organomegaly palpable  Pelvic: DEFERRED  Extremities: no redness or tenderness in the calves or thighs, no edema  Skin: normal coloration and turgor, no rashes  Neurological: alert, oriented, normal speech, no focal findings or movement disorder noted  Psych: full facial expressions, good grooming, good insight, normal perception, normal reasoning, normal speech pattern and content and normal thought patterns          ASSESSMENT:  normal postpartum exam    ICD-10-CM ICD-9-CM   1. Encounter for postpartum care and examination after delivery Z39.2 V24.2       PLAN:        Anticipatory guidelines discussed and emergency precautions given, verbalized understanding.     Follow up in: 4 weeks or as needed.

## 2020-04-14 ENCOUNTER — OFFICE VISIT (OUTPATIENT)
Dept: OBSTETRICS AND GYNECOLOGY | Facility: CLINIC | Age: 30
End: 2020-04-14
Payer: MEDICAID

## 2020-04-14 PROCEDURE — 0503F PR POSTPARTUM CARE VISIT: ICD-10-PCS | Mod: 95,,, | Performed by: ADVANCED PRACTICE MIDWIFE

## 2020-04-14 PROCEDURE — 0503F POSTPARTUM CARE VISIT: CPT | Mod: 95,,, | Performed by: ADVANCED PRACTICE MIDWIFE

## 2020-04-14 NOTE — PROGRESS NOTES
The patient location is: Home  The chief complaint leading to consultation is: pp visit vv   Visit type: audiovisual  Total time spent with patient: 15 minutes with 50%time spent on counseling   Each patient to whom he or she provides medical services by telemedicine is:  (1) informed of the relationship between the physician and patient and the respective role of any other health care provider with respect to management of the patient; and (2) notified that he or she may decline to receive medical services by telemedicine and may withdraw from such care at any time.    Notes: PPSVD             SUBJECTIVE:  Joanna Reyna is a 29 y.o.  female who presents for a postpartum visit. She is 8 weeks postpartum following a normal spontaneous vaginal delivery. Pregnancy complications: none. The delivery was at 40 gestational weeks. Postpartum course has been well. She is feeling well and happy. Baby's course has been well. Baby is feeding by breast. Bleeding no bleeding. Bowel function is normal. Bladder function is normal. Patient is sexually active. Contraception method is none. She plans to use no method for contraception.  Postpartum depression screening: negative.    Pregnancy #3 Problems (from 17 to present)     No problems associated with this episode.          I have fully reviewed the prenatal and intrapartum course.  The following portions of the patient's history were reviewed and updated as appropriate: allergies, current medications, past family history, past     OB History    Para Term  AB Living   3 3 3     3   SAB TAB Ectopic Multiple Live Births         0 3      # Outcome Date GA Lbr Rashawn/2nd Weight Sex Delivery Anes PTL Lv   3 Term 02/10/20 40w0d  3.805 kg (8 lb 6.2 oz) M Vag-Spont None N AGATHA   2 Term 13 40w0d  2.381 kg (5 lb 4 oz) M Vag-Spont EPI  AGATHA   1 Term 11 40w0d  3.629 kg (8 lb) M Vag-Spont EPI  AGATHA       ROS:  GENERAL: No fever, chills, fatigability or weight  loss.  VULVAR: No pain, no lesions and no itching.  VAGINAL: No relaxation, no itching, no discharge, no abnormal bleeding and no lesions.  ABDOMEN: No abdominal pain. Denies nausea. Denies vomiting. No diarrhea. No constipation  BREAST: Denies pain. No lumps. No discharge.  URINARY: No incontinence, no nocturia, no frequency and no dysuria.  CARDIOVASCULAR: No chest pain. No shortness of breath. No leg cramps.  NEUROLOGICAL: No headaches. No vision changes.    OBJECTIVE:   LMP 05/14/2019 (Exact Date) The patient is currently breastfeeding.There is no height or weight on file to calculate BMI.  Date of last Pap smear: No result found    General Appearance: alert, well appearing, in no apparent distress  Head: normocephalic, atraumatic  Abdomen: benign non-tender, without masses or organomegaly palpable  Pelvic: deferred   Extremities: no redness or tenderness in the calves or thighs, no edema  Skin: normal coloration and turgor, no rashes  Neurological: alert, oriented, normal speech, no focal findings or movement disorder noted  Psych: full facial expressions, good grooming, good insight, normal perception, normal reasoning, normal speech pattern and content and normal thought patterns          ASSESSMENT:  normal postpartum exam  able to resume normal activities    ICD-10-CM ICD-9-CM   1. Encounter for postpartum care and examination after delivery Z39.2 V24.2       PLAN:    See orders and Patient Instructions  Resume all normal activities    Anticipatory guidelines discussed and emergency precautions given, verbalized understanding.     Follow up in: 1 year or as needed.

## 2020-05-18 PROBLEM — O48.0 POST TERM PREGNANCY OVER 40 WEEKS: Status: RESOLVED | Noted: 2020-02-10 | Resolved: 2020-05-18

## 2021-05-10 ENCOUNTER — PATIENT MESSAGE (OUTPATIENT)
Dept: RESEARCH | Facility: HOSPITAL | Age: 31
End: 2021-05-10

## 2024-07-03 ENCOUNTER — OFFICE VISIT (OUTPATIENT)
Dept: OBSTETRICS AND GYNECOLOGY | Facility: CLINIC | Age: 34
End: 2024-07-03
Payer: COMMERCIAL

## 2024-07-03 VITALS
HEART RATE: 73 BPM | HEIGHT: 62 IN | SYSTOLIC BLOOD PRESSURE: 112 MMHG | BODY MASS INDEX: 30.63 KG/M2 | WEIGHT: 166.44 LBS | OXYGEN SATURATION: 100 % | DIASTOLIC BLOOD PRESSURE: 70 MMHG | RESPIRATION RATE: 16 BRPM

## 2024-07-03 DIAGNOSIS — Z11.3 SCREEN FOR STD (SEXUALLY TRANSMITTED DISEASE): ICD-10-CM

## 2024-07-03 DIAGNOSIS — N91.2 AMENORRHEA: Primary | ICD-10-CM

## 2024-07-03 DIAGNOSIS — Z32.01 POSITIVE PREGNANCY TEST: ICD-10-CM

## 2024-07-03 DIAGNOSIS — Z12.4 SCREENING FOR MALIGNANT NEOPLASM OF CERVIX: ICD-10-CM

## 2024-07-03 DIAGNOSIS — Z11.51 SCREENING FOR HUMAN PAPILLOMAVIRUS (HPV): ICD-10-CM

## 2024-07-03 PROBLEM — O47.9 THREATENED LABOR AT TERM: Status: RESOLVED | Noted: 2020-02-10 | Resolved: 2024-07-03

## 2024-07-03 LAB
B-HCG UR QL: POSITIVE
CTP QC/QA: YES

## 2024-07-03 PROCEDURE — 99204 OFFICE O/P NEW MOD 45 MIN: CPT | Mod: 25,S$GLB,, | Performed by: OBSTETRICS & GYNECOLOGY

## 2024-07-03 PROCEDURE — 87591 N.GONORRHOEAE DNA AMP PROB: CPT | Performed by: OBSTETRICS & GYNECOLOGY

## 2024-07-03 PROCEDURE — 87491 CHLMYD TRACH DNA AMP PROBE: CPT | Performed by: OBSTETRICS & GYNECOLOGY

## 2024-07-03 PROCEDURE — 99999 PR PBB SHADOW E&M-NEW PATIENT-LVL III: CPT | Mod: PBBFAC,,, | Performed by: OBSTETRICS & GYNECOLOGY

## 2024-07-03 PROCEDURE — 81025 URINE PREGNANCY TEST: CPT | Mod: S$GLB,,, | Performed by: OBSTETRICS & GYNECOLOGY

## 2024-07-03 PROCEDURE — 99203 OFFICE O/P NEW LOW 30 MIN: CPT | Mod: PBBFAC | Performed by: OBSTETRICS & GYNECOLOGY

## 2024-07-03 NOTE — PROGRESS NOTES
Subjective:   Patient ID: Joanna Reyna is a 34 y.o. y.o. female.     Chief Complaint: Missed Menses       History of Present Illness:    Joanna presents today complaining of amenorrhea. Patient's last menstrual period was 2024.. Prior menstrual cycles have been regular. She reports positive home pregnancy test. UPT is positive.     Three prior term SVDs; shoulder dystocia in prior delivery. No issues in prior pregnancies. She is ~4 weeks today.       Past Medical History:   Diagnosis Date    Depression      Past Surgical History:   Procedure Laterality Date    THYROIDECTOMY, PARTIAL       Social History     Socioeconomic History    Marital status: Single   Tobacco Use    Smoking status: Never    Smokeless tobacco: Never   Substance and Sexual Activity    Alcohol use: Not Currently    Drug use: Not Currently     Types: Marijuana     Comment: 2/10/20 PTA at hospital    Sexual activity: Yes     Partners: Male     Birth control/protection: None     Comment: single     Family History   Problem Relation Name Age of Onset    Colon cancer Paternal Grandfather      Ovarian cancer Mother      Breast cancer Neg Hx       OB History    Para Term  AB Living   5 3 3   1 3   SAB IAB Ectopic Multiple Live Births         0 3      # Outcome Date GA Lbr Rashawn/2nd Weight Sex Type Anes PTL Lv   5 Current            4 AB 2022           3 Term 02/10/20 40w0d  3.805 kg (8 lb 6.2 oz) M Vag-Spont None N AGATHA   2 Term 13 40w0d  2.381 kg (5 lb 4 oz) M Vag-Spont EPI  AGATHA   1 Term 11 40w0d  3.629 kg (8 lb) M Vag-Spont EPI  AGATHA         ROS:   Constitutional: Negative for chills, fever and weight loss.   HENT: Negative for congestion, ear discharge, hearing loss and nosebleeds.   Eyes: Negative for blurred vision and double vision.   Respiratory: Negative for cough, hemoptysis, sputum production and shortness of breath.   Cardiovascular: Negative for chest pain, palpitations and orthopnea.   Gastrointestinal:  Negative for abdominal pain, heartburn, nausea and vomiting.   Genitourinary: Negative for dysuria, frequency, hematuria and urgency.   Musculoskeletal: Negative for back pain, myalgias and neck pain.   Skin: Negative for itching and rash.   Neurological: Negative for dizziness, tingling, tremors and headaches.   Endo/Heme/Allergies: Negative for environmental allergies and polydipsia. Does not bruise/bleed easily.   Psychiatric/Behavioral: Negative for depression, hallucinations and substance abuse. The patient is not nervous/anxious.       Objective:   Vital Signs:  Vitals:    07/03/24 1207   BP: 112/70   Pulse: 73   Resp: 16         Physical Exam:  General:  alert, cooperative, appears stated age   Skin:  Skin color, texture, turgor normal. No rashes or lesions   HEENT:  conjunctivae/corneas clear. PERRL.   Neck: supple, trachea midline, no adenopathy or thyromegally   Respiratory:  clear to auscultation bilaterally   Heart:  regular rate and rhythm, S1, S2 normal, no murmur, click, rub or gallop   Breasts:   deferred   Abdomen:  normal findings: bowel sounds normal, no masses palpable, no organomegaly and soft, non-tender   Pelvis: External genitalia: normal general appearance  Urinary system: urethral meatus normal, bladder nontender  Vaginal: normal mucosa without prolapse or lesions  Cervix: normal appearance  Uterus: normal size, shape, position  Adnexa: normal size, nontender bilaterally   Extremities: Normal ROM; no edema, no cyanosis   Neurologial: Normal strength and tone. No focal numbness or weakness. Reflexes 2+ and equal.   Psychiatric: normal mood, speech, dress, and thought processes     Assessment:      1. Amenorrhea    2. Screening for malignant neoplasm of cervix    3. Screening for human papillomavirus (HPV)    4. Screen for STD (sexually transmitted disease)    5. Positive pregnancy test          Plan:      Amenorrhea  -     POCT Urine Pregnancy  -     US OB/GYN Procedure ([facility]) -  Extended List - Future; Future    Screening for malignant neoplasm of cervix  -     Liquid-Based Pap Smear, Screening    Screening for human papillomavirus (HPV)  -     HPV High Risk Genotypes, PCR    Screen for STD (sexually transmitted disease)  -     C. trachomatis/N. gonorrhoeae by AMP DNA [facility]; Cervix    Positive pregnancy test  -     Hepatitis C Antibody; Future; Expected date: 07/03/2024  -     HIV 1/2 Ag/Ab (4th Gen); Future; Expected date: 07/03/2024  -     RPR; Future; Expected date: 07/03/2024  -     Treponema Pallidium Antibodies IgG, IgM; Future; Expected date: 07/03/2024  -     Hepatitis B surface antigen; Future; Expected date: 07/03/2024  -     Type & Screen; Future; Expected date: 07/03/2024  -     Rubella antibody, IgG; Future; Expected date: 07/03/2024  -     CBC auto differential; Future; Expected date: 07/03/2024  -     Basic metabolic panel; Future; Expected date: 07/03/2024      Pap smear  HPV cotesting  Labs  GC/CT  OTC PNV  Dating scan in 4 weeks  RTC in 4 weeks    Patient was counseled today on proper weight gain based on the [place] [employer] [facility]'s recommendations based on her pre-pregnancy weight. Discussed foods to avoid in pregnancy (i.e. sushi, fish that are high in mercury, deli meat, and unpasteurized cheeses). Discussed prenatal vitamin options (i.e. stool softener, DHA). She was also counseled on safe, healthy behavior as well as medications safe in pregnancy.

## 2024-07-09 ENCOUNTER — LAB VISIT (OUTPATIENT)
Dept: LAB | Facility: HOSPITAL | Age: 34
End: 2024-07-09
Attending: OBSTETRICS & GYNECOLOGY
Payer: COMMERCIAL

## 2024-07-09 DIAGNOSIS — Z32.01 POSITIVE PREGNANCY TEST: ICD-10-CM

## 2024-07-09 LAB
ABO + RH BLD: NORMAL
ANION GAP SERPL CALC-SCNC: 6 MMOL/L (ref 8–16)
BASOPHILS # BLD AUTO: 0.03 K/UL (ref 0–0.2)
BASOPHILS NFR BLD: 0.4 % (ref 0–1.9)
BLD GP AB SCN CELLS X3 SERPL QL: NORMAL
BUN SERPL-MCNC: 10 MG/DL (ref 6–20)
CALCIUM SERPL-MCNC: 9 MG/DL (ref 8.7–10.5)
CHLORIDE SERPL-SCNC: 107 MMOL/L (ref 95–110)
CO2 SERPL-SCNC: 24 MMOL/L (ref 23–29)
CREAT SERPL-MCNC: 0.8 MG/DL (ref 0.5–1.4)
DIFFERENTIAL METHOD BLD: ABNORMAL
EOSINOPHIL # BLD AUTO: 0.1 K/UL (ref 0–0.5)
EOSINOPHIL NFR BLD: 0.7 % (ref 0–8)
ERYTHROCYTE [DISTWIDTH] IN BLOOD BY AUTOMATED COUNT: 13.6 % (ref 11.5–14.5)
EST. GFR  (NO RACE VARIABLE): >60 ML/MIN/1.73 M^2
GLUCOSE SERPL-MCNC: 88 MG/DL (ref 70–110)
HBV SURFACE AG SERPL QL IA: NORMAL
HCT VFR BLD AUTO: 36.5 % (ref 37–48.5)
HCV AB SERPL QL IA: NORMAL
HGB BLD-MCNC: 12.3 G/DL (ref 12–16)
HIV 1+2 AB+HIV1 P24 AG SERPL QL IA: NORMAL
IMM GRANULOCYTES # BLD AUTO: 0.04 K/UL (ref 0–0.04)
IMM GRANULOCYTES NFR BLD AUTO: 0.5 % (ref 0–0.5)
LYMPHOCYTES # BLD AUTO: 2.5 K/UL (ref 1–4.8)
LYMPHOCYTES NFR BLD: 33 % (ref 18–48)
MCH RBC QN AUTO: 30.9 PG (ref 27–31)
MCHC RBC AUTO-ENTMCNC: 33.7 G/DL (ref 32–36)
MCV RBC AUTO: 92 FL (ref 82–98)
MONOCYTES # BLD AUTO: 0.4 K/UL (ref 0.3–1)
MONOCYTES NFR BLD: 5.9 % (ref 4–15)
NEUTROPHILS # BLD AUTO: 4.5 K/UL (ref 1.8–7.7)
NEUTROPHILS NFR BLD: 59.5 % (ref 38–73)
NRBC BLD-RTO: 0 /100 WBC
PLATELET # BLD AUTO: 266 K/UL (ref 150–450)
PMV BLD AUTO: 10.4 FL (ref 9.2–12.9)
POTASSIUM SERPL-SCNC: 3.8 MMOL/L (ref 3.5–5.1)
RBC # BLD AUTO: 3.98 M/UL (ref 4–5.4)
SODIUM SERPL-SCNC: 137 MMOL/L (ref 136–145)
SPECIMEN OUTDATE: NORMAL
TREPONEMA PALLIDUM IGG+IGM AB [PRESENCE] IN SERUM OR PLASMA BY IMMUNOASSAY: NONREACTIVE
WBC # BLD AUTO: 7.51 K/UL (ref 3.9–12.7)

## 2024-07-09 PROCEDURE — 87340 HEPATITIS B SURFACE AG IA: CPT | Performed by: OBSTETRICS & GYNECOLOGY

## 2024-07-09 PROCEDURE — 86762 RUBELLA ANTIBODY: CPT | Performed by: OBSTETRICS & GYNECOLOGY

## 2024-07-09 PROCEDURE — 80048 BASIC METABOLIC PNL TOTAL CA: CPT | Performed by: OBSTETRICS & GYNECOLOGY

## 2024-07-09 PROCEDURE — 36415 COLL VENOUS BLD VENIPUNCTURE: CPT | Performed by: OBSTETRICS & GYNECOLOGY

## 2024-07-09 PROCEDURE — 85025 COMPLETE CBC W/AUTO DIFF WBC: CPT | Performed by: OBSTETRICS & GYNECOLOGY

## 2024-07-09 PROCEDURE — 86592 SYPHILIS TEST NON-TREP QUAL: CPT | Performed by: OBSTETRICS & GYNECOLOGY

## 2024-07-09 PROCEDURE — 86850 RBC ANTIBODY SCREEN: CPT | Performed by: OBSTETRICS & GYNECOLOGY

## 2024-07-09 PROCEDURE — 86593 SYPHILIS TEST NON-TREP QUANT: CPT | Performed by: OBSTETRICS & GYNECOLOGY

## 2024-07-09 PROCEDURE — 87389 HIV-1 AG W/HIV-1&-2 AB AG IA: CPT | Performed by: OBSTETRICS & GYNECOLOGY

## 2024-07-09 PROCEDURE — 86803 HEPATITIS C AB TEST: CPT | Performed by: OBSTETRICS & GYNECOLOGY

## 2024-07-10 LAB
RPR SER QL: NORMAL
RUBV IGG SER-ACNC: 60 IU/ML
RUBV IGG SER-IMP: REACTIVE

## 2024-08-02 ENCOUNTER — INITIAL PRENATAL (OUTPATIENT)
Dept: OBSTETRICS AND GYNECOLOGY | Facility: CLINIC | Age: 34
End: 2024-08-02
Payer: COMMERCIAL

## 2024-08-02 ENCOUNTER — PROCEDURE VISIT (OUTPATIENT)
Dept: MATERNAL FETAL MEDICINE | Facility: CLINIC | Age: 34
End: 2024-08-02
Payer: COMMERCIAL

## 2024-08-02 VITALS
WEIGHT: 173.06 LBS | DIASTOLIC BLOOD PRESSURE: 80 MMHG | HEART RATE: 68 BPM | BODY MASS INDEX: 31.65 KG/M2 | SYSTOLIC BLOOD PRESSURE: 122 MMHG

## 2024-08-02 DIAGNOSIS — Z3A.09 9 WEEKS GESTATION OF PREGNANCY: ICD-10-CM

## 2024-08-02 DIAGNOSIS — O09.521 MULTIGRAVIDA OF ADVANCED MATERNAL AGE IN FIRST TRIMESTER: ICD-10-CM

## 2024-08-02 DIAGNOSIS — Z34.81 ENCOUNTER FOR SUPERVISION OF OTHER NORMAL PREGNANCY IN FIRST TRIMESTER: Primary | ICD-10-CM

## 2024-08-02 DIAGNOSIS — N91.2 AMENORRHEA: ICD-10-CM

## 2024-08-02 DIAGNOSIS — R35.0 URINARY FREQUENCY: ICD-10-CM

## 2024-08-02 PROCEDURE — 76801 OB US < 14 WKS SINGLE FETUS: CPT | Mod: S$GLB,,, | Performed by: OBSTETRICS & GYNECOLOGY

## 2024-08-02 PROCEDURE — 87086 URINE CULTURE/COLONY COUNT: CPT | Performed by: OBSTETRICS & GYNECOLOGY

## 2024-08-02 PROCEDURE — 99999 PR PBB SHADOW E&M-EST. PATIENT-LVL III: CPT | Mod: PBBFAC,,, | Performed by: OBSTETRICS & GYNECOLOGY

## 2024-08-02 PROCEDURE — 0502F SUBSEQUENT PRENATAL CARE: CPT | Mod: S$GLB,,, | Performed by: OBSTETRICS & GYNECOLOGY

## 2024-08-02 NOTE — PROGRESS NOTES
Patient with no obstetrical complaints. Reviewed prenatal labs with patient. Reviewed dating criteria. Discussed proper nutrition and weight gain in pregnancy. No vaginal bleeding or cramping noted. SAB precautions discussed. Patient to RTC in 4 weeks.     Discussed cffDNA; unsure at this time.     Patient with burning with urination; urine culture collected.     Vitals signs, FHTs, urine dip, and PE findings documented, reviewed and available in OB flow chart.       I spent a total of 20 minutes on the day of the visit.This includes face to face time and non-face to face time preparing to see the patient (eg, review of tests), Obtaining and/or reviewing separately obtained history, Documenting clinical information in the electronic or other health record, Independently interpreting resultsand communicating results to the patient/family/caregiver, or Care coordination.     Initial ob packet supplied to patient. Contents supplied and discussed include medications safe in pregnancy, pregnancy A to Z, class schedule, pregnancy checklist, car seat safety, and handout on skin to skin and breastfeeding. Coeffective aaron card supplied and discussed.

## 2024-08-03 LAB
BACTERIA UR CULT: NORMAL
BACTERIA UR CULT: NORMAL

## 2024-08-05 ENCOUNTER — TELEPHONE (OUTPATIENT)
Dept: OBSTETRICS AND GYNECOLOGY | Facility: CLINIC | Age: 34
End: 2024-08-05

## 2024-08-05 ENCOUNTER — CLINICAL SUPPORT (OUTPATIENT)
Dept: OBSTETRICS AND GYNECOLOGY | Facility: CLINIC | Age: 34
End: 2024-08-05
Payer: COMMERCIAL

## 2024-08-05 DIAGNOSIS — R39.9 UTI SYMPTOMS: Primary | ICD-10-CM

## 2024-08-05 PROCEDURE — 87086 URINE CULTURE/COLONY COUNT: CPT | Performed by: OBSTETRICS & GYNECOLOGY

## 2024-08-06 LAB
BACTERIA UR CULT: NORMAL
BACTERIA UR CULT: NORMAL

## 2024-08-30 ENCOUNTER — ROUTINE PRENATAL (OUTPATIENT)
Dept: OBSTETRICS AND GYNECOLOGY | Facility: CLINIC | Age: 34
End: 2024-08-30
Payer: COMMERCIAL

## 2024-08-30 VITALS
WEIGHT: 172.63 LBS | SYSTOLIC BLOOD PRESSURE: 118 MMHG | HEART RATE: 69 BPM | DIASTOLIC BLOOD PRESSURE: 80 MMHG | BODY MASS INDEX: 31.57 KG/M2

## 2024-08-30 DIAGNOSIS — Z3A.13 13 WEEKS GESTATION OF PREGNANCY: ICD-10-CM

## 2024-08-30 DIAGNOSIS — Z34.81 ENCOUNTER FOR SUPERVISION OF OTHER NORMAL PREGNANCY IN FIRST TRIMESTER: Primary | ICD-10-CM

## 2024-08-30 PROBLEM — Z32.01 PREGNANCY EXAMINATION OR TEST, POSITIVE RESULT: Status: RESOLVED | Noted: 2019-07-16 | Resolved: 2024-08-30

## 2024-08-30 PROBLEM — Z86.19 HISTORY OF CHLAMYDIA: Status: RESOLVED | Noted: 2019-08-13 | Resolved: 2024-08-30

## 2024-08-30 PROCEDURE — 99999 PR PBB SHADOW E&M-EST. PATIENT-LVL II: CPT | Mod: PBBFAC,,, | Performed by: OBSTETRICS & GYNECOLOGY

## 2024-08-30 NOTE — PROGRESS NOTES
Patient doing well. No vaginal bleeding or cramping noted. Discussed the need for an anatomy scan between 18-20 weeks. Discussed genetic testing/screening; cffDNA ordered. RTC in 4 weeks.    Vitals signs, FHTs, urine dip, and PE findings documented, reviewed and available in OB flow chart.       I spent a total of 20 minutes on the day of the visit.This includes face to face time and non-face to face time preparing to see the patient (eg, review of tests), Obtaining and/or reviewing separately obtained history, Documenting clinical information in the electronic or other health record, Independently interpreting resultsand communicating results to the patient/family/caregiver, or Care coordination.      Coffective counseling sheet Get Ready discussed with mother. Reinforced avoiding induction of labor unless medically indicated as well as comfort measures during labor.  Encouraged mother to download Coffective mobile aaron if she has not already done so. Mother verbalizes understanding.